# Patient Record
Sex: MALE | Race: WHITE | NOT HISPANIC OR LATINO | URBAN - METROPOLITAN AREA
[De-identification: names, ages, dates, MRNs, and addresses within clinical notes are randomized per-mention and may not be internally consistent; named-entity substitution may affect disease eponyms.]

---

## 2017-10-25 ENCOUNTER — OUTPATIENT (OUTPATIENT)
Dept: OUTPATIENT SERVICES | Facility: HOSPITAL | Age: 77
LOS: 1 days | Discharge: HOME | End: 2017-10-25

## 2017-10-25 DIAGNOSIS — R13.10 DYSPHAGIA, UNSPECIFIED: ICD-10-CM

## 2018-03-12 ENCOUNTER — OUTPATIENT (OUTPATIENT)
Dept: OUTPATIENT SERVICES | Facility: HOSPITAL | Age: 78
LOS: 1 days | Discharge: HOME | End: 2018-03-12

## 2018-03-12 ENCOUNTER — INPATIENT (INPATIENT)
Facility: HOSPITAL | Age: 78
LOS: 9 days | Discharge: HOME HEALTH | End: 2018-03-22
Attending: INTERNAL MEDICINE | Admitting: INTERNAL MEDICINE

## 2018-03-12 VITALS
HEART RATE: 90 BPM | RESPIRATION RATE: 18 BRPM | DIASTOLIC BLOOD PRESSURE: 76 MMHG | TEMPERATURE: 97 F | OXYGEN SATURATION: 96 % | SYSTOLIC BLOOD PRESSURE: 167 MMHG

## 2018-03-12 DIAGNOSIS — R13.10 DYSPHAGIA, UNSPECIFIED: ICD-10-CM

## 2018-03-12 DIAGNOSIS — R63.4 ABNORMAL WEIGHT LOSS: ICD-10-CM

## 2018-03-12 DIAGNOSIS — I10 ESSENTIAL (PRIMARY) HYPERTENSION: ICD-10-CM

## 2018-03-12 DIAGNOSIS — E78.00 PURE HYPERCHOLESTEROLEMIA, UNSPECIFIED: ICD-10-CM
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
## 2018-03-12 DIAGNOSIS — Z98.890 OTHER SPECIFIED POSTPROCEDURAL STATES: Chronic | ICD-10-CM

## 2018-03-12 LAB
ALBUMIN SERPL ELPH-MCNC: 3.8 G/DL — SIGNIFICANT CHANGE UP (ref 3–5.5)
ALP SERPL-CCNC: 82 U/L — SIGNIFICANT CHANGE UP (ref 30–115)
ALT FLD-CCNC: 13 U/L — SIGNIFICANT CHANGE UP (ref 0–41)
ANION GAP SERPL CALC-SCNC: 9 MMOL/L — SIGNIFICANT CHANGE UP (ref 7–14)
APTT BLD: 35.7 SEC — SIGNIFICANT CHANGE UP (ref 27–39.2)
AST SERPL-CCNC: 20 U/L — SIGNIFICANT CHANGE UP (ref 0–41)
BASOPHILS # BLD AUTO: 0.03 K/UL — SIGNIFICANT CHANGE UP (ref 0–0.2)
BASOPHILS NFR BLD AUTO: 0.4 % — SIGNIFICANT CHANGE UP (ref 0–1)
BILIRUB SERPL-MCNC: 0.6 MG/DL — SIGNIFICANT CHANGE UP (ref 0.2–1.2)
BLD GP AB SCN SERPL QL: SIGNIFICANT CHANGE UP
BUN SERPL-MCNC: 12 MG/DL — SIGNIFICANT CHANGE UP (ref 10–20)
CALCIUM SERPL-MCNC: 10 MG/DL — SIGNIFICANT CHANGE UP (ref 8.5–10.1)
CHLORIDE SERPL-SCNC: 101 MMOL/L — SIGNIFICANT CHANGE UP (ref 98–110)
CO2 SERPL-SCNC: 28 MMOL/L — SIGNIFICANT CHANGE UP (ref 17–32)
CREAT SERPL-MCNC: 0.9 MG/DL — SIGNIFICANT CHANGE UP (ref 0.7–1.5)
EOSINOPHIL # BLD AUTO: 0.05 K/UL — SIGNIFICANT CHANGE UP (ref 0–0.7)
EOSINOPHIL NFR BLD AUTO: 0.7 % — SIGNIFICANT CHANGE UP (ref 0–8)
GLUCOSE SERPL-MCNC: 93 MG/DL — SIGNIFICANT CHANGE UP (ref 70–110)
HCT VFR BLD CALC: 43.5 % — SIGNIFICANT CHANGE UP (ref 42–52)
HGB BLD-MCNC: 14.1 G/DL — SIGNIFICANT CHANGE UP (ref 14–18)
IMM GRANULOCYTES NFR BLD AUTO: 0.4 % — HIGH (ref 0.1–0.3)
INR BLD: 1.17 RATIO — SIGNIFICANT CHANGE UP (ref 0.65–1.3)
LYMPHOCYTES # BLD AUTO: 1.09 K/UL — LOW (ref 1.2–3.4)
LYMPHOCYTES # BLD AUTO: 15.6 % — LOW (ref 20.5–51.1)
MCHC RBC-ENTMCNC: 28.4 PG — SIGNIFICANT CHANGE UP (ref 27–31)
MCHC RBC-ENTMCNC: 32.4 G/DL — SIGNIFICANT CHANGE UP (ref 32–37)
MCV RBC AUTO: 87.7 FL — SIGNIFICANT CHANGE UP (ref 80–94)
MONOCYTES # BLD AUTO: 0.48 K/UL — SIGNIFICANT CHANGE UP (ref 0.1–0.6)
MONOCYTES NFR BLD AUTO: 6.9 % — SIGNIFICANT CHANGE UP (ref 1.7–9.3)
NEUTROPHILS # BLD AUTO: 5.29 K/UL — SIGNIFICANT CHANGE UP (ref 1.4–6.5)
NEUTROPHILS NFR BLD AUTO: 76 % — HIGH (ref 42.2–75.2)
NRBC # BLD: 0 /100 WBCS — SIGNIFICANT CHANGE UP (ref 0–0)
PLATELET # BLD AUTO: 398 K/UL — SIGNIFICANT CHANGE UP (ref 130–400)
POTASSIUM SERPL-MCNC: 3.9 MMOL/L — SIGNIFICANT CHANGE UP (ref 3.5–5)
POTASSIUM SERPL-SCNC: 3.9 MMOL/L — SIGNIFICANT CHANGE UP (ref 3.5–5)
PROT SERPL-MCNC: 7 G/DL — SIGNIFICANT CHANGE UP (ref 6–8)
PROTHROM AB SERPL-ACNC: 12.7 SEC — SIGNIFICANT CHANGE UP (ref 9.95–12.87)
RBC # BLD: 4.96 M/UL — SIGNIFICANT CHANGE UP (ref 4.7–6.1)
RBC # FLD: 14.6 % — HIGH (ref 11.5–14.5)
SODIUM SERPL-SCNC: 138 MMOL/L — SIGNIFICANT CHANGE UP (ref 135–146)
TYPE + AB SCN PNL BLD: SIGNIFICANT CHANGE UP
WBC # BLD: 6.97 K/UL — SIGNIFICANT CHANGE UP (ref 4.8–10.8)
WBC # FLD AUTO: 6.97 K/UL — SIGNIFICANT CHANGE UP (ref 4.8–10.8)

## 2018-03-12 RX ORDER — SIMVASTATIN 20 MG/1
1 TABLET, FILM COATED ORAL
Qty: 0 | Refills: 0 | COMMUNITY

## 2018-03-12 RX ORDER — ENOXAPARIN SODIUM 100 MG/ML
40 INJECTION SUBCUTANEOUS EVERY 24 HOURS
Qty: 0 | Refills: 0 | Status: DISCONTINUED | OUTPATIENT
Start: 2018-03-12 | End: 2018-03-18

## 2018-03-12 RX ORDER — LOSARTAN POTASSIUM 100 MG/1
100 TABLET, FILM COATED ORAL DAILY
Qty: 0 | Refills: 0 | Status: DISCONTINUED | OUTPATIENT
Start: 2018-03-12 | End: 2018-03-13

## 2018-03-12 RX ORDER — SIMVASTATIN 20 MG/1
20 TABLET, FILM COATED ORAL AT BEDTIME
Qty: 0 | Refills: 0 | Status: DISCONTINUED | OUTPATIENT
Start: 2018-03-12 | End: 2018-03-13

## 2018-03-12 RX ORDER — PANTOPRAZOLE SODIUM 20 MG/1
40 TABLET, DELAYED RELEASE ORAL
Qty: 0 | Refills: 0 | Status: DISCONTINUED | OUTPATIENT
Start: 2018-03-12 | End: 2018-03-13

## 2018-03-12 NOTE — ED PROVIDER NOTE - NS ED ROS FT
Constitutional: No fever or chills. Normal appetite. 20 lb weight loss in 2 mo. +fatigue and weakness.   Eyes: No vision changes.  ENT: No hearing changes. No ear pain. No sore throat. No surgical site pain.   Neck: No neck pain or stiffness.  Cardiovascular: No chest pain, palpitations, or edema.  Pulmonary: No cough or SOB. No hemoptysis.  Abdominal: No abdominal pain, nausea, vomiting, or diarrhea.   : No dysuria or frequency. No hematuria.   Neuro: No headache, syncope, or dizziness.  MS: No back pain. No calf pain/swelling.  Psych: No suicidal or homicidal ideations.

## 2018-03-12 NOTE — H&P ADULT - NSHPPHYSICALEXAM_GEN_ALL_CORE
General: NAD, Emaciated  HEENT: Surgical scar present. No palpable masses  Heart: RRR, normal S1S2  Lungs: GBBS  Abdomen: +BS, soft, non tender  extremities: no edema

## 2018-03-12 NOTE — ED PROVIDER NOTE - OBJECTIVE STATEMENT
78y M w PMH HTN, HLD, zenker's diverticulum resection 2 months ago by Dr. Brooks at [facility] presents after failed swallowing study today with rehab. Pt states that his procedure was complicated by a hematoma that had to be decompressed the next day. Since the surgery, pt has had difficulty swallowing anything, despite consistent speech therapy. Pt has lost 20 lbs since surgery. Swallowing study today showed pharyngeal dysphagia, and rehab spoke to Dr. Be who recommended PEG placement. Pt has not been evaluated by GI at this point. Pt has also had consistent and worsening hoarseness, which was diagnosed as vocal cord paralysis by his outpt speech therapist but has not been evaluated by ENT since the procedure.

## 2018-03-12 NOTE — ED ADULT NURSE NOTE - CCCP TRG CHIEF CMPLNT
patient s/p zenter's diverticulitis and having difficulty with speech and swallowing/difficulty swallowing

## 2018-03-12 NOTE — H&P ADULT - ATTENDING COMMENTS
patient seen and examined independently on morning rounds, chart reviewed and discussed with medicine resident and agree with the above H/P and assessment and plan with the following addendum:     in brief, Mr. Luna is an 79 yo man with h/o zenckers diverticulum s/p surgical resection january 10 at OSH ([facility]) with post op complicated course including hematoma who now p/w unintentional 20 lb weight loss, dysphagia and decreased po intake as well as voice changes/hoarseness post-operatively.  He was referred by home therapist to see speech/swallow and get barium swallow done which showed signfiicant cause for aspiration (including immobile epiglottis) and sent to the ED where he was admitted to medicicne service for dysphagia w/u and consideration for peg tube.  He denies any fever/chills, sob, chest pain or other associated symptoms.  Review of systems otherwise unremarkable.    pmhx- htn, zenker diverticulum, DLD    meds- see med reconn    psurg hx- zenker diverticulectomy and ? 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 Chowdhury      PE:   NAD, AAOx3, thin, cachectic, +hoarseness, +left diagnoal lateral neck surgical scar   Chest: CTAB, fair air entry  CVS: +s1/s2 rrr  ABD: soft NT, NT +BS  Ext: no e/c/c      labs/radiology reviewed    a/p: 79 yo man with h/o zenckers diverticulum s/p surgical resection 1/10/2018 at OSH ([facility]) who p/w 9 weeks postop of decreased po intake, dysphagia, voice changes and 20 lb weight loss.    #Dysphagia- concern for post-op neuropathy (recurrent laryngeal N damage?)  -keep npo---failed barium swallow with high risk for aspiration  -obtain med records from OSH ([facility])  -CT neck/chest with contrast  -GI evaluation---consider PEG placement thurs/friday  -ENT evaluation  -speech and swallow  -start IVF--suppl electrolytes---if unable to get PEG within next few days would consider TPN/PPN vs NGT  -nutrition eval for feedings once alternative means of feedign in place  -cont ppi    #DVT/GI ppx      -

## 2018-03-12 NOTE — H&P ADULT - PROBLEM SELECTOR PLAN 1
oropharyngeal vs esophageal   [facility] eval for PEG tube evaluation  ENT eval for possible scoping  Nutrition eval Dr Moses  Speech eval to follow  Pureed diet

## 2018-03-12 NOTE — ED PROVIDER NOTE - PHYSICAL EXAMINATION
Constitutional: Well developed. Cachectic. NAD. Good general hygiene.   Head: Atraumatic. Thin hair.   Eyes: PERRLA. EOMI without discomfort.   ENT: No nasal discharge. Mucous membranes moist.  Neck: Supple. Painless ROM.  Cardiovascular: Regular rhythm. Regular rate. Normal S1 and S2. No murmurs. 2+ pulses in all extremities.   Pulmonary: Normal respiratory rate and effort. Lungs clear to auscultation bilaterally. No wheezing, rales, or rhonchi. Bilateral, equal lung expansion.   Abdominal: Soft. Nondistended. Nontender. No rebound or guarding.   Extremities. Pelvis stable. No lower extremity edema. Symmetric calves.  Skin: No rashes.   Neuro: AAOx3. No focal neurological deficits.  Psych: Normal mood. Normal affect.

## 2018-03-12 NOTE — H&P ADULT - NSHPLABSRESULTS_GEN_ALL_CORE
14.1   6.97  )-----------( 398      ( 12 Mar 2018 12:29 )             43.5     03-12    138  |  101  |  12  ----------------------------<  93  3.9   |  28  |  0.9    Ca    10.0      12 Mar 2018 12:29    TPro  7.0  /  Alb  3.8  /  TBili  0.6  /  DBili  x   /  AST  20  /  ALT  13  /  AlkPhos  82  03-12    CXR: No radiographic evidence of acute cardiopulmonary disease.

## 2018-03-12 NOTE — ED PROVIDER NOTE - PROGRESS NOTE DETAILS
ATTENDING NOTE:   77 y/o M s/p recent zenker-diverticulum surgery with complication of hematoma in area, still unable to swallow whereby some liquids and solids get aspirated and others feel to get stuck in esophagus. Pt with 20 pound weight loss over the last several weeks due to this. Had swallow study today confirming that food bolus gets stuck in pharynx.   Exam as documented. Pt is cachectic, will require PEG-tube. Pt is stable for floor.

## 2018-03-13 DIAGNOSIS — R13.10 DYSPHAGIA, UNSPECIFIED: ICD-10-CM

## 2018-03-13 DIAGNOSIS — Z02.9 ENCOUNTER FOR ADMINISTRATIVE EXAMINATIONS, UNSPECIFIED: ICD-10-CM

## 2018-03-13 LAB
ANION GAP SERPL CALC-SCNC: 11 MMOL/L — SIGNIFICANT CHANGE UP (ref 7–14)
APTT BLD: 35.9 SEC — SIGNIFICANT CHANGE UP (ref 27–39.2)
BASOPHILS # BLD AUTO: 0.03 K/UL — SIGNIFICANT CHANGE UP (ref 0–0.2)
BASOPHILS NFR BLD AUTO: 0.5 % — SIGNIFICANT CHANGE UP (ref 0–1)
BLD GP AB SCN SERPL QL: SIGNIFICANT CHANGE UP
BUN SERPL-MCNC: 10 MG/DL — SIGNIFICANT CHANGE UP (ref 10–20)
CALCIUM SERPL-MCNC: 10.2 MG/DL — HIGH (ref 8.5–10.1)
CHLORIDE SERPL-SCNC: 98 MMOL/L — SIGNIFICANT CHANGE UP (ref 98–110)
CO2 SERPL-SCNC: 26 MMOL/L — SIGNIFICANT CHANGE UP (ref 17–32)
CREAT SERPL-MCNC: 0.9 MG/DL — SIGNIFICANT CHANGE UP (ref 0.7–1.5)
CRP SERPL-MCNC: 1.1 MG/DL — HIGH (ref 0–0.4)
EOSINOPHIL # BLD AUTO: 0.07 K/UL — SIGNIFICANT CHANGE UP (ref 0–0.7)
EOSINOPHIL NFR BLD AUTO: 1.2 % — SIGNIFICANT CHANGE UP (ref 0–8)
GLUCOSE SERPL-MCNC: 113 MG/DL — HIGH (ref 70–110)
HCT VFR BLD CALC: 43.3 % — SIGNIFICANT CHANGE UP (ref 42–52)
HGB BLD-MCNC: 14.5 G/DL — SIGNIFICANT CHANGE UP (ref 14–18)
IMM GRANULOCYTES NFR BLD AUTO: 0.5 % — HIGH (ref 0.1–0.3)
INR BLD: 1.17 RATIO — SIGNIFICANT CHANGE UP (ref 0.65–1.3)
LYMPHOCYTES # BLD AUTO: 1.05 K/UL — LOW (ref 1.2–3.4)
LYMPHOCYTES # BLD AUTO: 18.6 % — LOW (ref 20.5–51.1)
MAGNESIUM SERPL-MCNC: 1.9 MG/DL — SIGNIFICANT CHANGE UP (ref 1.8–2.4)
MCHC RBC-ENTMCNC: 28.8 PG — SIGNIFICANT CHANGE UP (ref 27–31)
MCHC RBC-ENTMCNC: 33.5 G/DL — SIGNIFICANT CHANGE UP (ref 32–37)
MCV RBC AUTO: 85.9 FL — SIGNIFICANT CHANGE UP (ref 80–94)
MONOCYTES # BLD AUTO: 0.39 K/UL — SIGNIFICANT CHANGE UP (ref 0.1–0.6)
MONOCYTES NFR BLD AUTO: 6.9 % — SIGNIFICANT CHANGE UP (ref 1.7–9.3)
NEUTROPHILS # BLD AUTO: 4.07 K/UL — SIGNIFICANT CHANGE UP (ref 1.4–6.5)
NEUTROPHILS NFR BLD AUTO: 72.3 % — SIGNIFICANT CHANGE UP (ref 42.2–75.2)
PLATELET # BLD AUTO: 411 K/UL — HIGH (ref 130–400)
POTASSIUM SERPL-MCNC: 3.8 MMOL/L — SIGNIFICANT CHANGE UP (ref 3.5–5)
POTASSIUM SERPL-SCNC: 3.8 MMOL/L — SIGNIFICANT CHANGE UP (ref 3.5–5)
PROTHROM AB SERPL-ACNC: 12.7 SEC — SIGNIFICANT CHANGE UP (ref 9.95–12.87)
RBC # BLD: 5.04 M/UL — SIGNIFICANT CHANGE UP (ref 4.7–6.1)
RBC # FLD: 14.6 % — HIGH (ref 11.5–14.5)
SODIUM SERPL-SCNC: 135 MMOL/L — SIGNIFICANT CHANGE UP (ref 135–146)
TYPE + AB SCN PNL BLD: SIGNIFICANT CHANGE UP
WBC # BLD: 5.64 K/UL — SIGNIFICANT CHANGE UP (ref 4.8–10.8)
WBC # FLD AUTO: 5.64 K/UL — SIGNIFICANT CHANGE UP (ref 4.8–10.8)

## 2018-03-13 RX ORDER — PANTOPRAZOLE SODIUM 20 MG/1
40 TABLET, DELAYED RELEASE ORAL DAILY
Qty: 0 | Refills: 0 | Status: DISCONTINUED | OUTPATIENT
Start: 2018-03-13 | End: 2018-03-14

## 2018-03-13 RX ORDER — SODIUM CHLORIDE 9 MG/ML
1000 INJECTION INTRAMUSCULAR; INTRAVENOUS; SUBCUTANEOUS
Qty: 0 | Refills: 0 | Status: DISCONTINUED | OUTPATIENT
Start: 2018-03-13 | End: 2018-03-14

## 2018-03-13 RX ADMIN — SODIUM CHLORIDE 75 MILLILITER(S): 9 INJECTION INTRAMUSCULAR; INTRAVENOUS; SUBCUTANEOUS at 18:16

## 2018-03-13 RX ADMIN — LOSARTAN POTASSIUM 100 MILLIGRAM(S): 100 TABLET, FILM COATED ORAL at 05:27

## 2018-03-13 RX ADMIN — PANTOPRAZOLE SODIUM 40 MILLIGRAM(S): 20 TABLET, DELAYED RELEASE ORAL at 05:28

## 2018-03-13 RX ADMIN — ENOXAPARIN SODIUM 40 MILLIGRAM(S): 100 INJECTION SUBCUTANEOUS at 05:25

## 2018-03-13 RX ADMIN — PANTOPRAZOLE SODIUM 40 MILLIGRAM(S): 20 TABLET, DELAYED RELEASE ORAL at 18:16

## 2018-03-13 NOTE — CONSULT NOTE ADULT - SUBJECTIVE AND OBJECTIVE BOX
Patient is a 78y old  Male who presents with a chief complaint of dysphasia (12 Mar 2018 22:36)    HPI:  77 yo M with hx of Zenker's Diverticulum s/p surgical resection in January 2018, HTN, DLD presenting with above chief complaint. He was today at the speech and swallow therapist and his swallow testing was abnormal so he was referred to the hospital for further evaluation. He and his wife report dysphagia to solids and liquids after the surgery since January. The patient reports episodes of aspirations and dysphagia and episodes of cough post meals. He reports also weight loss of 20 pounds after the surgery. He denies any fever, chills, or chest pains or history of aspiration pneumonias. Patient on is a pureed diet at home at feels he has lost energy even though he is still active and goes to the gym.  He reports that he followed up with his surgeon twice after the surgery who reassured him that the surgery was successful and the cause for his dysphagia was "all in his head". (12 Mar 2018 15:12)  pt underwent modified barium swallow at [facility] 03/12/18 - pt made npo, 8/8 silent aspiration, sent to ed for PEG placement and given SLP dysphagia exercises to do.  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  pt states he has hoarse voice whispered since surgery     PAST MEDICAL & SURGICAL HISTORY:  Zenker diverticulum  High cholesterol  HTN (hypertension)  H/O excision of Zenker's diverticulum      Home Medications:  candesartan 32 mg oral tablet: 1 tab(s) orally once a day (12 Mar 2018 15:30)  omeprazole 20 mg oral delayed release tablet: 1 tab(s) orally once a day (12 Mar 2018 15:30)  simvastatin 20 mg oral tablet: 1 tab(s) orally once a day (at bedtime) (12 Mar 2018 15:30)      Allergies    No Known Allergies    Vital Signs Last 24 Hrs    T(F): 97 (12 Mar 2018 22:35), Max: 98 (12 Mar 2018 11:00)  HR: 70 (12 Mar 2018 22:35) (70 - 90)  BP: 143/73 (12 Mar 2018 22:35) (128/76 - 167/76)    RR: 18 (12 Mar 2018 22:35) (18 - 19)  SpO2: 96% (12 Mar 2018 22:35) (96% - 100%)      PHYSICAL EXAM:      Constitutional:  aaxo x 3, speech whispered hoarse speech    Eyes: perrl, eomi    ENMT: nares- no discharge, oral exam - buccal mucosa pink moist, tongue, pink moist, free of lesion, masses, or ulcers.  uvula midline, retromolar trigone nonendemic nonerythmasnous  oropharynx patent, without bleed or streaking on walls    pt refusing FFL exam at this time , wants to be sedated    Neck: no lymph adenopathy    Respiratory: cta b/l no difficulty breathing no stridor, no wheeze    Cardiovascular: s1, s2    Gastrointestinal: obese, nontender                              14.1   6.97  )-----------( 398      ( 12 Mar 2018 12:29 )             43.5         03-12    138  |  101  |  12  ----------------------------<  93  3.9   |  28  |  0.9    Ca    10.0      12 Mar 2018 12:29    TPro  7.0  /  Alb  3.8  /  TBili  0.6  /  DBili  x   /  AST  20  /  ALT  13  /  AlkPhos  82  03-12      PT/INR - ( 12 Mar 2018 12:29 )   PT: 12.70 sec;   INR: 1.17 ratio         PTT - ( 12 Mar 2018 12:29 )  PTT:35.7 sec        MEDICATIONS  (STANDING):  enoxaparin Injectable 40 milliGRAM(s) SubCutaneous every 24 hours  losartan 100 milliGRAM(s) Oral daily  pantoprazole    Tablet 40 milliGRAM(s) Oral before breakfast  simvastatin 20 milliGRAM(s) Oral at bedtime    MEDICATIONS  (PRN):

## 2018-03-13 NOTE — PROGRESS NOTE ADULT - SUBJECTIVE AND OBJECTIVE BOX
SUBJECTIVE:Patient is a 78y old  Male who presents with a chief complaint of dysphasia (12 Mar 2018 22:36)  . Today is hospital day 1d     PAST MEDICAL & SURGICAL HISTORY  PAST MEDICAL & SURGICAL HISTORY:  Zenker diverticulum  High cholesterol  HTN (hypertension)  H/O excision of Zenker's diverticulum    ALLERGIES:  No Known Allergies    MEDICATIONS:  STANDING MEDICATIONS  enoxaparin Injectable 40 milliGRAM(s) SubCutaneous every 24 hours  losartan 100 milliGRAM(s) Oral daily  pantoprazole    Tablet 40 milliGRAM(s) Oral before breakfast  simvastatin 20 milliGRAM(s) Oral at bedtime    PRN MEDICATIONS    VITALS:   T(F): 97  HR: 87  BP: 135/67  RR: 18  SpO2: 96%    LABS:                        14.1   6.97  )-----------( 398      ( 12 Mar 2018 12:29 )             43.5     03-12    138  |  101  |  12  ----------------------------<  93  3.9   |  28  |  0.9    Ca    10.0      12 Mar 2018 12:29    TPro  7.0  /  Alb  3.8  /  TBili  0.6  /  DBili  x   /  AST  20  /  ALT  13  /  AlkPhos  82  03-12    PT/INR - ( 12 Mar 2018 12:29 )   PT: 12.70 sec;   INR: 1.17 ratio         PTT - ( 12 Mar 2018 12:29 )  PTT:35.7 sec    PHYSICAL EXAM:  GEN:   LUNGS: Clear to auscultation bilaterally   HEART: S1/S2 present. RRR.   ABD: Soft, non-tender, non-distended. Bowel sounds present  EXT: SUBJECTIVE:Patient is a 78y old  Male who presents with a chief complaint of dysphasia (12 Mar 2018 22:36)  Today is hospital day 1d. Patient seen and examined. NAD. Admits to 20 lb weight loss in 2months. Pooling secretions in mouth. Speech and Swallow evaluation completed, recommended NPO diet for now. Pending GI and Nutritional evaluation.     PAST MEDICAL & SURGICAL HISTORY  PAST MEDICAL & SURGICAL HISTORY:  Zenker diverticulum  High cholesterol  HTN (hypertension)  H/O excision of Zenker's diverticulum    ALLERGIES:  No Known Allergies    MEDICATIONS:  STANDING MEDICATIONS  enoxaparin Injectable 40 milliGRAM(s) SubCutaneous every 24 hours  losartan 100 milliGRAM(s) Oral daily  pantoprazole    Tablet 40 milliGRAM(s) Oral before breakfast  simvastatin 20 milliGRAM(s) Oral at bedtime    PRN MEDICATIONS    VITALS:   T(F): 97  HR: 87  BP: 135/67  RR: 18  SpO2: 96%    LABS:                        14.1   6.97  )-----------( 398      ( 12 Mar 2018 12:29 )             43.5     03-12    138  |  101  |  12  ----------------------------<  93  3.9   |  28  |  0.9    Ca    10.0      12 Mar 2018 12:29    TPro  7.0  /  Alb  3.8  /  TBili  0.6  /  DBili  x   /  AST  20  /  ALT  13  /  AlkPhos  82  03-12    PT/INR - ( 12 Mar 2018 12:29 )   PT: 12.70 sec;   INR: 1.17 ratio         PTT - ( 12 Mar 2018 12:29 )  PTT:35.7 sec    PHYSICAL EXAM:  GEN: NAD. Thin almost cachectic male   LUNGS: Clear to auscultation bilaterally   HEART: S1/S2 present. RRR.   ABD: Soft, non-tender, non-distended. Bowel sounds present  EXT: No edema b/l

## 2018-03-13 NOTE — PROGRESS NOTE ADULT - ASSESSMENT
77 yo M with hx of Zenker's Diverticulum s/p surgical resection in January 2018, HTN, DLD presenting for the evaluation of worsening dysphagia.. He was today at the speech and swallow therapist and his swallow testing was abnormal so he was referred to the hospital for further evaluation. He and his wife report dysphagia to solids and liquids after the surgery since January. The patient reports episodes of aspirations and dysphagia and episodes of cough post meals. He reports also weight loss of 20 pounds after the surgery. He reports that he followed up with his surgeon twice after the surgery who reassured him that the surgery was successful and the cause for his dysphagia was "all in his head".    IMPRESSION:  # Residual Dysphagia s/p Surgical excision of zenker diverticulum ( oropharyngeal vs esophageal)  [facility] eval for PEG tube evaluation  ENT consulted: FFL exam by attn for evaluation of larynx tvc paresis   Nutrition eval Dr Godinez  Speech eval to follow  Pureed diet.     # Weight loss of more than 10% body weight:  Nutrition evaluation with Dr Gutierres service  Should improve with J tube placement.     #  HTN (hypertension): Switch candesartan to Losartan.     # High cholesterol: continue simvastatin 77 yo M with hx of Zenker's Diverticulum s/p surgical resection in January 2018, HTN, DLD presenting for the evaluation of worsening dysphagia.. He was today at the speech and swallow therapist and his swallow testing was abnormal so he was referred to the hospital for further evaluation. He and his wife report dysphagia to solids and liquids after the surgery since January (done at [facility]). The patient reports episodes of aspirations and dysphagia and episodes of cough post meals. He reports also weight loss of 20 pounds after the surgery. He reports that he followed up with his surgeon twice after the surgery who reassured him that the surgery was successful and the cause for his dysphagia was "all in his head".    IMPRESSION:  # Residual Dysphagia s/p Surgical excision of zenker diverticulum ( oropharyngeal vs esophageal)  [facility] eval for PEG tube evaluation: CT Chest/Neck  ENT consulted: FFL exam by attending for evaluation of larynx tvc paresis   Nutrition eval Dr Godinez pending  Speech eval recommend NPO diet for now    # Weight loss of more than 10% body weight:Nutrition evaluation with Dr Gutierres service    #  HTN (hypertension): Switched candesartan to Losartan.     # Hypercholestrolemia: continue simvastatin

## 2018-03-13 NOTE — CONSULT NOTE ADULT - SUBJECTIVE AND OBJECTIVE BOX
Gastroenterology Consultation    78y year old  Male with :    Patient is a 78y old  Male who presents with a chief complaint of dysphasia    HPI:  79 yo M with hx of Zenker's Diverticulum s/p surgical resection in January 2018, HTN, DLD presenting with above chief complaint. He was today at the speech and swallow therapist and his swallow testing was abnormal so he was referred to the hospital for further evaluation. He and his wife report dysphagia to solids and liquids after the surgery since January. The patient reports episodes of aspirations and dysphagia and episodes of cough post meals. He reports also weight loss of 20 pounds after the surgery. He denies any fever, chills, or chest pains or history of aspiration pneumonias. Patient on is a pureed diet at home at feels he has lost energy even though he is still active and goes to the gym.  Patient presents to [facility] because of decreased PO intake SP zencker surgery and because he was not getting answers from his surgeon.      PAST MEDICAL & SURGICAL HISTORY:  Zenker diverticulum  High cholesterol  HTN (hypertension)  H/O excision of Zenker's diverticulum      Allergies: No Known Allergies    Medications:   enoxaparin Injectable 40 milliGRAM(s) SubCutaneous every 24 hours  losartan 100 milliGRAM(s) Oral daily  pantoprazole    Tablet 40 milliGRAM(s) Oral before breakfast  simvastatin 20 milliGRAM(s) Oral at bedtime        Review of Systems: noncontributory other than listed in Admission H & P    Physical Examination:  T(C): 36.1 (03-12-18 @ 22:35), Max: 36.7 (03-12-18 @ 11:00)  HR: 87 (03-13-18 @ 05:21) (70 - 90)  BP: 135/67 (03-13-18 @ 05:21) (128/76 - 167/76)  RR: 18 (03-12-18 @ 22:35) (18 - 19)  SpO2: 96% (03-12-18 @ 22:35) (96% - 100%)      GA: NAD  HEENT: PERRLA  Heart: Regular  Lungs: clear  Abdomen: Soft  Ext noedema    Data:                        14.1   6.97  )-----------( 398      ( 12 Mar 2018 12:29 )             43.5     03-12    138  |  101  |  12  ----------------------------<  93  3.9   |  28  |  0.9    Ca    10.0      12 Mar 2018 12:29    TPro  7.0  /  Alb  3.8  /  TBili  0.6  /  DBili  x   /  AST  20  /  ALT  13  /  AlkPhos  82  03-12    LIVER FUNCTIONS - ( 12 Mar 2018 12:29 )  Alb: 3.8 g/dL / Pro: 7.0 g/dL / ALK PHOS: 82 U/L / ALT: 13 U/L / AST: 20 U/L / GGT: x           PT/INR - ( 12 Mar 2018 12:29 )   PT: 12.70 sec;   INR: 1.17 ratio    PTT - ( 12 Mar 2018 12:29 )  PTT:35.7 sec    Rad:  Modified barrium on 3/12    Impression:    Dysphagia. Gastroenterology Consultation    78y year old  Male with :    Patient is a 78y old  Male who presents with a chief complaint of dysphasia    HPI:  77 yo M with hx of Zenker's Diverticulum s/p surgical resection in January 2018, HTN, DLD presenting with above chief complaint. He was today at the speech and swallow therapist and his swallow testing was abnormal so he was referred to the hospital for further evaluation. He and his wife report dysphagia to solids and liquids after the surgery since January. The patient reports episodes of aspirations and dysphagia and episodes of cough post meals. He reports also weight loss of 20 pounds after the surgery. He denies any fever, chills, or chest pains or history of aspiration pneumonias. Patient on is a pureed diet at home at feels he has lost energy even though he is still active and goes to the gym.  Patient presents to [facility] because of decreased PO intake SP zencker surgery and because he was not getting answers from his surgeon.  Describes bolus globus senstaion with food stuck in the back of his mouth he spits his food right away. denies blood  Last colonoscopy by dr haynes 6 years ago  no EGD in the past (diagnosed by ent and esoaphogram)      PAST MEDICAL & SURGICAL HISTORY:  Zenker diverticulum  High cholesterol  HTN (hypertension)  H/O excision of Zenker's diverticulum      Allergies: No Known Allergies    Medications:   enoxaparin Injectable 40 milliGRAM(s) SubCutaneous every 24 hours  losartan 100 milliGRAM(s) Oral daily  pantoprazole    Tablet 40 milliGRAM(s) Oral before breakfast  simvastatin 20 milliGRAM(s) Oral at bedtime    Review of Systems: noncontributory other than listed in Admission H & P    Physical Examination:  T(C): 36.1 (03-12-18 @ 22:35), Max: 36.7 (03-12-18 @ 11:00)  HR: 87 (03-13-18 @ 05:21) (70 - 90)  BP: 135/67 (03-13-18 @ 05:21) (128/76 - 167/76)  RR: 18 (03-12-18 @ 22:35) (18 - 19)  SpO2: 96% (03-12-18 @ 22:35) (96% - 100%)      GA: NAD  HEENT: PERRLA  Heart: Regular  Lungs: clear  Abdomen: Soft  Ext no edema    Data:                        14.1   6.97  )-----------( 398      ( 12 Mar 2018 12:29 )             43.5     03-12    138  |  101  |  12  ----------------------------<  93  3.9   |  28  |  0.9    Ca    10.0      12 Mar 2018 12:29    TPro  7.0  /  Alb  3.8  /  TBili  0.6  /  DBili  x   /  AST  20  /  ALT  13  /  AlkPhos  82  03-12    LIVER FUNCTIONS - ( 12 Mar 2018 12:29 )  Alb: 3.8 g/dL / Pro: 7.0 g/dL / ALK PHOS: 82 U/L / ALT: 13 U/L / AST: 20 U/L / GGT: x           PT/INR - ( 12 Mar 2018 12:29 )   PT: 12.70 sec;   INR: 1.17 ratio    PTT - ( 12 Mar 2018 12:29 )  PTT:35.7 sec    Rad:  Modified barrium on 3/12  Dysphagia. Gastroenterology Consultation    78y year old  Male with :    Patient is a 78y old  Male who presents with a chief complaint of dysphasia    HPI:  79 yo M with hx of Zenker's Diverticulum s/p surgical resection in January 2018 complicated by a hematoma at the site of surgery requiring a second surgery, HTN, DLD presenting with above chief complaint. He was today at the speech and swallow therapist and his swallow testing was abnormal so he was referred to the hospital for further evaluation. He and his wife report dysphagia to solids and liquids after the surgery since January. The patient reports episodes of aspirations and dysphagia and episodes of cough post meals. He reports also weight loss of 20 pounds after the surgery. He denies any fever, chills, or chest pains or history of aspiration pneumonias. Patient on is a pureed diet at home at feels he has lost energy even though he is still active and goes to the gym.  Patient presents to [facility] because of decreased PO intake SP zencker surgery and because he was not getting answers from his surgeon.  Describes bolus globus senstaion with food stuck in the back of his mouth he spits his food right away. denies blood  Last colonoscopy by dr haynes 6 years ago  No EGD in the past (diagnosed by ent and esoaphogram)      PAST MEDICAL & SURGICAL HISTORY:  Zenker diverticulum  High cholesterol  HTN (hypertension)  H/O excision of Zenker's diverticulum      Allergies: No Known Allergies    Medications:   enoxaparin Injectable 40 milliGRAM(s) SubCutaneous every 24 hours  losartan 100 milliGRAM(s) Oral daily  pantoprazole    Tablet 40 milliGRAM(s) Oral before breakfast  simvastatin 20 milliGRAM(s) Oral at bedtime    Review of Systems: noncontributory other than listed in Admission H & P    Physical Examination:  T(C): 36.1 (03-12-18 @ 22:35), Max: 36.7 (03-12-18 @ 11:00)  HR: 87 (03-13-18 @ 05:21) (70 - 90)  BP: 135/67 (03-13-18 @ 05:21) (128/76 - 167/76)  RR: 18 (03-12-18 @ 22:35) (18 - 19)  SpO2: 96% (03-12-18 @ 22:35) (96% - 100%)      GA: NAD  HEENT: PERRLA  Heart: Regular  Lungs: clear  Abdomen: Soft  Ext no edema    Data:                        14.1   6.97  )-----------( 398      ( 12 Mar 2018 12:29 )             43.5     03-12    138  |  101  |  12  ----------------------------<  93  3.9   |  28  |  0.9    Ca    10.0      12 Mar 2018 12:29    TPro  7.0  /  Alb  3.8  /  TBili  0.6  /  DBili  x   /  AST  20  /  ALT  13  /  AlkPhos  82  03-12    LIVER FUNCTIONS - ( 12 Mar 2018 12:29 )  Alb: 3.8 g/dL / Pro: 7.0 g/dL / ALK PHOS: 82 U/L / ALT: 13 U/L / AST: 20 U/L / GGT: x           PT/INR - ( 12 Mar 2018 12:29 )   PT: 12.70 sec;   INR: 1.17 ratio    PTT - ( 12 Mar 2018 12:29 )  PTT:35.7 sec    Rad:  Modified barrium on 3/12  Dysphagia. Gastroenterology Consultation    78y year old  Male with :    Patient is a 78y old  Male who presents with a chief complaint of dysphagia    HPI:  79 yo M with hx of Zenker's Diverticulum s/p surgical resection in January 2018 complicated by a hematoma at the site of surgery requiring a second surgery, HTN, DLD presenting with above chief complaint. He was today at the speech and swallow therapist and his swallow testing was abnormal so he was referred to the hospital for further evaluation. He and his wife report dysphagia to solids and liquids after the surgery since January. The patient reports episodes of aspirations and dysphagia and episodes of cough post meals. He reports also weight loss of 20 pounds after the surgery. He denies any fever, chills, or chest pains or history of aspiration pneumonias. Patient on is a pureed diet at home at feels he has lost energy even though he is still active and goes to the gym.  Patient presents to [facility] because of decreased PO intake SP zenker surgery and because he was not getting answers from his surgeon.  Describes globus senstaion with food stuck in the back of his mouth he spits his food right away. denies blood  Last colonoscopy by dr brink 6 years ago  No EGD in the past (diagnosed by ent and esoaphogram)      PAST MEDICAL & SURGICAL HISTORY:  Zenker diverticulum  High cholesterol  HTN (hypertension)  H/O excision of Zenker's diverticulum      Allergies: No Known Allergies    Medications:   enoxaparin Injectable 40 milliGRAM(s) SubCutaneous every 24 hours  losartan 100 milliGRAM(s) Oral daily  pantoprazole    Tablet 40 milliGRAM(s) Oral before breakfast  simvastatin 20 milliGRAM(s) Oral at bedtime    Review of Systems: noncontributory other than listed in Admission H & P    Physical Examination:  T(C): 36.1 (03-12-18 @ 22:35), Max: 36.7 (03-12-18 @ 11:00)  HR: 87 (03-13-18 @ 05:21) (70 - 90)  BP: 135/67 (03-13-18 @ 05:21) (128/76 - 167/76)  RR: 18 (03-12-18 @ 22:35) (18 - 19)  SpO2: 96% (03-12-18 @ 22:35) (96% - 100%)      GA: NAD  HEENT: PERRLA. hoarse voice  Heart: Regular  Lungs: clear  Abdomen: Soft  Ext no edema  neck: scar and swelling on the side of his neck    Data:                        14.1   6.97  )-----------( 398      ( 12 Mar 2018 12:29 )             43.5     03-12    138  |  101  |  12  ----------------------------<  93  3.9   |  28  |  0.9    Ca    10.0      12 Mar 2018 12:29    TPro  7.0  /  Alb  3.8  /  TBili  0.6  /  DBili  x   /  AST  20  /  ALT  13  /  AlkPhos  82  03-12    LIVER FUNCTIONS - ( 12 Mar 2018 12:29 )  Alb: 3.8 g/dL / Pro: 7.0 g/dL / ALK PHOS: 82 U/L / ALT: 13 U/L / AST: 20 U/L / GGT: x           PT/INR - ( 12 Mar 2018 12:29 )   PT: 12.70 sec;   INR: 1.17 ratio    PTT - ( 12 Mar 2018 12:29 )  PTT:35.7 sec    Rad:  Modified barrium on 3/12  Dysphagia.

## 2018-03-13 NOTE — SWALLOW BEDSIDE ASSESSMENT ADULT - SLP PERTINENT HISTORY OF CURRENT PROBLEM
s/p zenkers diverticulectomy with post op complications of hematoma and worsening dysphagia and voice

## 2018-03-13 NOTE — CONSULT NOTE ADULT - ASSESSMENT
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

## 2018-03-13 NOTE — CONSULT NOTE ADULT - ATTENDING COMMENTS
I have seen, examined and scoped the patient at the bedside.  Patient is s/p left anterior cervical approach to a Zenker's diverticulum. Left vocal cord diminished motion seen on exam today, probably related to dysphagia and dysphonia.    Recommend CT scan of the neck with contrast to plan next step in treatment. possible option of vocal cord injection with calcium hydroxyapetite.     will follow after CT

## 2018-03-13 NOTE — CONSULT NOTE ADULT - ASSESSMENT
77 yo M SP recent zenker's surgery presents to the ED for dysphagia.  - Ct neck/chest  - ENT eval  - Would recommend follow up by CT surgery (preferably the orginal surgeon) for eval and possible treatment prior to PEg placement. 79 yo M SP recent zenker's surgery presents to the ED for dysphagia.  - Ct neck/chest w IV contrast  - ENT eval  - If the CT fails to show a mechanical anatomical etiology for the dysphagia we will probably schedule him for a PEG tube placement. the case will be discussed with anesthesia.  - NPO for now 79 yo M SP recent zenker's surgery presents to the ED for dysphagia.  - Ct neck/chest w IV contrast  - ENT eval  - If the CT fails to show a mechanical anatomical etiology for the dysphagia (favoring possible vagal nerve damage) we will probably schedule him for a PEG tube placement. the case will be discussed with anesthesia.  - NPO for now 79 yo M SP recent zenker's surgery presents to the ED for dysphagia.   Discussed with speech pathology who believe that the surgery was effective and that his residual symptoms are related to possible nerve injury  - Ct neck/chest w IV contrast  - ENT eval  -obtain records (including CD of imaging) from [facility] [[email or web address]]  - If the CT fails to show a mechanical anatomical etiology for the dysphagia (favoring possible vagal nerve damage) we will probably schedule him for a PEG tube placement. the patient will be discussed with anesthesia.  - NPO for now   - IVF

## 2018-03-14 LAB
ANION GAP SERPL CALC-SCNC: 10 MMOL/L — SIGNIFICANT CHANGE UP (ref 7–14)
APTT BLD: 42.7 SEC — HIGH (ref 27–39.2)
BUN SERPL-MCNC: 13 MG/DL — SIGNIFICANT CHANGE UP (ref 10–20)
CALCIUM SERPL-MCNC: 9.5 MG/DL — SIGNIFICANT CHANGE UP (ref 8.5–10.1)
CHLORIDE SERPL-SCNC: 104 MMOL/L — SIGNIFICANT CHANGE UP (ref 98–110)
CO2 SERPL-SCNC: 23 MMOL/L — SIGNIFICANT CHANGE UP (ref 17–32)
CREAT SERPL-MCNC: 1 MG/DL — SIGNIFICANT CHANGE UP (ref 0.7–1.5)
GLUCOSE SERPL-MCNC: 62 MG/DL — LOW (ref 70–110)
HCT VFR BLD CALC: 39.6 % — LOW (ref 42–52)
HGB BLD-MCNC: 13.1 G/DL — LOW (ref 14–18)
MCHC RBC-ENTMCNC: 28.9 PG — SIGNIFICANT CHANGE UP (ref 27–31)
MCHC RBC-ENTMCNC: 33.1 G/DL — SIGNIFICANT CHANGE UP (ref 32–37)
MCV RBC AUTO: 87.4 FL — SIGNIFICANT CHANGE UP (ref 80–94)
NRBC # BLD: 0 /100 WBCS — SIGNIFICANT CHANGE UP (ref 0–0)
PLATELET # BLD AUTO: 336 K/UL — SIGNIFICANT CHANGE UP (ref 130–400)
POTASSIUM SERPL-MCNC: 3.8 MMOL/L — SIGNIFICANT CHANGE UP (ref 3.5–5)
POTASSIUM SERPL-SCNC: 3.8 MMOL/L — SIGNIFICANT CHANGE UP (ref 3.5–5)
PREALB SERPL-MCNC: 18 MG/DL — SIGNIFICANT CHANGE UP (ref 18–45)
RBC # BLD: 4.53 M/UL — LOW (ref 4.7–6.1)
RBC # FLD: 14.6 % — HIGH (ref 11.5–14.5)
SODIUM SERPL-SCNC: 137 MMOL/L — SIGNIFICANT CHANGE UP (ref 135–146)
TRIGL SERPL-MCNC: 102 MG/DL — SIGNIFICANT CHANGE UP (ref 40–150)
TRIGL SERPL-MCNC: 74 MG/DL — SIGNIFICANT CHANGE UP (ref 40–150)
WBC # BLD: 6.32 K/UL — SIGNIFICANT CHANGE UP (ref 4.8–10.8)
WBC # FLD AUTO: 6.32 K/UL — SIGNIFICANT CHANGE UP (ref 4.8–10.8)

## 2018-03-14 RX ORDER — ELECTROLYTE SOLUTION,INJ
1 VIAL (ML) INTRAVENOUS
Qty: 0 | Refills: 0 | Status: DISCONTINUED | OUTPATIENT
Start: 2018-03-14 | End: 2018-03-14

## 2018-03-14 RX ORDER — I.V. FAT EMULSION 20 G/100ML
1.93 EMULSION INTRAVENOUS
Qty: 111.17 | Refills: 0 | Status: DISCONTINUED | OUTPATIENT
Start: 2018-03-14 | End: 2018-03-14

## 2018-03-14 RX ADMIN — ENOXAPARIN SODIUM 40 MILLIGRAM(S): 100 INJECTION SUBCUTANEOUS at 05:46

## 2018-03-14 RX ADMIN — I.V. FAT EMULSION 34.74 GM/KG/DAY: 20 EMULSION INTRAVENOUS at 21:31

## 2018-03-14 RX ADMIN — Medication 1 EACH: at 21:32

## 2018-03-14 NOTE — CHART NOTE - NSCHARTNOTEFT_GEN_A_CORE
77 yo male with hx of Zenker's Diverticulum s/p surgical resection 1/10/18 by Dr. Brooks at [facility]. Since surgery his voice is weak/hoarse, PO intake has worsened due to dysphagia to liquids and solids (speech therapist comes to the house for therapy) and reports a 25lb weight loss with associated weakness.  He presented for further evaluation after his swallow test was abnormal. The patient reports episodes of aspiration and dysphagia and episodes of cough post meals. He denies any fever, chills, chest pains or history of aspiration pneumonia.. Patient on is a pureed diet at home at feels he has lost energy even though he is still active and goes to the gym. He reports that he followed up with his surgeon twice after the surgery who reassured him that the surgery was successful and the cause for his dysphagia was "all in his head". (12 Mar 2018 15:12)    A&O X 3  Voice hoarse and weak  Appears thin with muscle wasting & loss of subcutaneous fat  Abd: soft, ND  Ext: no edema  Peripheral IV right arm, no CVC    PAST MEDICAL & SURGICAL HISTORY:  Zenker diverticulum  High cholesterol  HTN (hypertension)  H/O excision of Zenker's diverticulum (1/10/18)    Home Medications:  candesartan 32 mg oral tablet: 1 tab(s) orally once a day (12 Mar 2018 15:30)  omeprazole 20 mg oral delayed release tablet: 1 tab(s) orally once a day (12 Mar 2018 15:30)  simvastatin 20 mg oral tablet: 1 tab(s) orally once a day (at bedtime) (12 Mar 2018 15:30)  ***reports primary doctor started him on Vitamin B-12 shots last week and he's due for another weekly shot this week    MEDICATIONS  (STANDING):  enoxaparin Injectable 40 milliGRAM(s) SubCutaneous every 24 hours  fat emulsion (Plant Based) 20% Infusion 1.93 Gm/kG/Day (34.74 mL/Hr) IV Continuous <Continuous>*** (ordered by NST to start tonight)  pantoprazole  Injectable 40 milliGRAM(s) IV Push daily ***(D/C once PPN starts, pepcid in PPN)  Parenteral Nutrition - Adult 1 Each (75 mL/Hr) TPN Continuous <Continuous> ***(ordered by NST to start tonight)  sodium chloride 0.9%. 1000 milliLiter(s) (75 mL/Hr) IV Continuous <Continuous> ***(to be d/c'd once PPN starts)    Allergies    No Known Allergies    Diet: NPO    03-14    137  |  104  |  13  ----------------------------<  62<L>  3.8   |  23  |  1.0    Ca    9.5      14 Mar 2018 07:34  Mg     1.9     03-13    TPro  7.0  /  Alb  3.8  /  TBili  0.6  /  DBili  x   /  AST  20  /  ALT  13  /  AlkPhos  82  03-12                        13.1   6.32  )-----------( 336      ( 14 Mar 2018 07:34 )             39.6       < from: Xray Modified Barium Swallow (03.12.18 @ 10:47) >    EXAM:  XR MODIFIED BARIUM SWALLOW            PROCEDURE DATE:  03/12/2018    INTERPRETATION:  Clinical History / Reason for exam: Dysphagia.    Procedure: Various consistencies of food were given to the patient and   swallowing was observed under fluoroscopy.  This study was performed in   conjunction with the speech pathology department.    Findings/  Impression:    Dysphagia.    Fluoroscopic assistance was provided by the Radiologist to the speech   pathologist for a modified barium swallow.  Please refer to the speech   pathologist's report for detailed description of the findings and   recommendations.    Per speech note: Profound pharyngeal dysphagia as per VFSS on 3/12    Drug Dosing Weight  Height (cm): 170.18 (12 Mar 2018 22:35)  Weight (kg): 57.6 (12 Mar 2018 22:35)  BMI (kg/m2): 19.9 (12 Mar 2018 22:35)  BSA (m2): 1.67 (12 Mar 2018 22:35)    Reports usual weight is approximately 152lbs and currently 127lbs  *severe 16% weight loss over past 3 months    Impression:  Zenker's diverticulum s/p removal 1/10/18 with post-op dysphagia and dysphonia  Severe pharyngeal dysphagia (on VFSS)  Severe weight loss, 16% over 3 months  HTN  Dyslipidemia    Recommendations:  Pt unable to tolerate PO due to dysphagia over the past 2 months and is currently NPO while workup in progress. CT chest/neck is planned for today, await results. Seen by ENT and GI. ? nerve involvement. Depending on CT results may benefit from PEG long term. For now will start PPN while workup in progress and until treatment plan formulated  1. PPN to begin tonight  2. D/C IVF's once PPN begins tonight  3. D/C protonix, pepcid is in PPN (protonix not compatible in PPN)  4. Check TG level today prior to starting PPN  5. 3/15 AM BMP, IP, MG, vitamin B-12 level, vitamin D  6. F/U CT results 77 yo male with hx of Zenker's Diverticulum s/p surgical resection 1/10/18 by Dr. Brooks at [facility]. Since surgery his voice is weak/hoarse, PO intake has worsened due to dysphagia to liquids and solids (speech therapist comes to the house for therapy) and reports a 25lb weight loss with associated weakness.  He presented for further evaluation after his swallow test was abnormal. The patient reports episodes of aspiration and dysphagia and episodes of cough post meals. He denies any fever, chills, chest pains or history of aspiration pneumonia.. Patient on is a pureed diet at home at feels he has lost energy even though he is still active and goes to the gym. He reports that he followed up with his surgeon twice after the surgery who reassured him that the surgery was successful and the cause for his dysphagia was "all in his head". (12 Mar 2018 15:12)    A&O X 3  Voice hoarse and weak  Appears thin with muscle wasting & loss of subcutaneous fat  Abd: soft, ND  Ext: no edema  Peripheral IV right arm, no CVC    PAST MEDICAL & SURGICAL HISTORY:  Zenker diverticulum  High cholesterol  HTN (hypertension)  H/O excision of Zenker's diverticulum (1/10/18)    Home Medications:  candesartan 32 mg oral tablet: 1 tab(s) orally once a day (12 Mar 2018 15:30)  omeprazole 20 mg oral delayed release tablet: 1 tab(s) orally once a day (12 Mar 2018 15:30)  simvastatin 20 mg oral tablet: 1 tab(s) orally once a day (at bedtime) (12 Mar 2018 15:30)  ***reports primary doctor started him on Vitamin B-12 shots last week and he's due for another weekly shot this week    MEDICATIONS  (STANDING):  enoxaparin Injectable 40 milliGRAM(s) SubCutaneous every 24 hours  fat emulsion (Plant Based) 20% Infusion 1.93 Gm/kG/Day (34.74 mL/Hr) IV Continuous <Continuous>*** (ordered by NST to start tonight)  pantoprazole  Injectable 40 milliGRAM(s) IV Push daily ***(D/C once PPN starts, pepcid in PPN)  Parenteral Nutrition - Adult 1 Each (75 mL/Hr) TPN Continuous <Continuous> ***(ordered by NST to start tonight)  sodium chloride 0.9%. 1000 milliLiter(s) (75 mL/Hr) IV Continuous <Continuous> ***(to be d/c'd once PPN starts)    Allergies    No Known Allergies    Diet: NPO    03-14    137  |  104  |  13  ----------------------------<  62<L>  3.8   |  23  |  1.0    Ca    9.5      14 Mar 2018 07:34  Mg     1.9     03-13    TPro  7.0  /  Alb  3.8  /  TBili  0.6  /  DBili  x   /  AST  20  /  ALT  13  /  AlkPhos  82  03-12                        13.1   6.32  )-----------( 336      ( 14 Mar 2018 07:34 )             39.6     C-Reactive Protein, Serum (03.13.18 @ 08:27)    C-Reactive Protein, Serum: 1.10 mg/dL  Prealbumin, Serum (03.13.18 @ 08:27)    Prealbumin, Serum: 18 mg/dL    < from: Xray Modified Barium Swallow (03.12.18 @ 10:47) >    EXAM:  XR MODIFIED BARIUM SWALLOW            PROCEDURE DATE:  03/12/2018    INTERPRETATION:  Clinical History / Reason for exam: Dysphagia.    Procedure: Various consistencies of food were given to the patient and   swallowing was observed under fluoroscopy.  This study was performed in   conjunction with the speech pathology department.    Findings/  Impression:    Dysphagia.    Fluoroscopic assistance was provided by the Radiologist to the speech   pathologist for a modified barium swallow.  Please refer to the speech   pathologist's report for detailed description of the findings and   recommendations.    Per speech note: Profound pharyngeal dysphagia as per VFSS on 3/12    Drug Dosing Weight  Height (cm): 170.18 (12 Mar 2018 22:35)  Weight (kg): 57.6 (12 Mar 2018 22:35)  BMI (kg/m2): 19.9 (12 Mar 2018 22:35)  BSA (m2): 1.67 (12 Mar 2018 22:35)    Reports usual weight is approximately 152lbs and currently 127lbs  *severe 16% weight loss over past 3 months    Impression:  Zenker's diverticulum s/p removal 1/10/18 with post-op dysphagia and dysphonia  Severe pharyngeal dysphagia (on VFSS)  Severe weight loss, 16% over 3 months  HTN  Dyslipidemia    Recommendations:  Pt unable to tolerate PO due to dysphagia over the past 2 months and is currently NPO while workup in progress. CT chest/neck is planned for today, await results. Seen by ENT and GI. ? nerve involvement. Depending on CT results may benefit from PEG long term. For now will start PPN while workup in progress and until treatment plan formulated  1. PPN to begin tonight  2. D/C IVF's once PPN begins tonight  3. D/C protonix, pepcid is in PPN (protonix not compatible in PPN)  4. Check TG level today prior to starting PPN  5. 3/15 AM BMP, IP, MG, vitamin B-12 level, vitamin D  6. F/U CT results

## 2018-03-14 NOTE — SWALLOW BEDSIDE ASSESSMENT ADULT - SWALLOW EVAL: DIAGNOSIS
profound pharyngeal dysphagia and gross silent aspiration as per MBS 3/12
Profound pharyngeal dysphagia as per VFSS on 3/12

## 2018-03-14 NOTE — PROGRESS NOTE ADULT - ASSESSMENT
79 yo M with hx of Zenker's Diverticulum s/p surgical resection in January 2018, HTN, DLD presenting for the evaluation of worsening dysphagia.. He was today at the speech and swallow therapist and his swallow testing was abnormal so he was referred to the hospital for further evaluation. He and his wife report dysphagia to solids and liquids after the surgery since January (done at [facility]). The patient reports episodes of aspirations and dysphagia and episodes of cough post meals. He reports also weight loss of 20 pounds after the surgery. He reports that he followed up with his surgeon twice after the surgery who reassured him that the surgery was successful and the cause for his dysphagia was "all in his head".    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 Patient to be started on PPN today  f/u labs for AM placed    #  HTN (hypertension): Switched candesartan to Losartan.     # Hypercholesterolemia: continue simvastatin

## 2018-03-14 NOTE — SWALLOW BEDSIDE ASSESSMENT ADULT - ASR SWALLOW ASPIRATION MONITOR
change of breathing pattern/cough/throat clearing/oral hygiene/fever/upper respiratory infection/gurgly voice/pneumonia
fever/pneumonia/throat clearing/upper respiratory infection/change of breathing pattern/cough/gurgly voice/Frequent oral care may swish water and spit out/oral hygiene

## 2018-03-14 NOTE — PROGRESS NOTE ADULT - ASSESSMENT
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    #Dysphagia- concern for post-op neuropathy (recurrent laryngeal N damage?)  -keep npo---failed barium swallow with high risk for aspiration  -attempting to obtain med records from OSH ([facility])  -CT neck/chest with contrast  -GI consult f/u----consider PEG placement thursday/friday   -ENT evaluation  -speech and swallow following  -start IVF--suppl electrolytes--  -start PPN today----d/w patient and wife and want to wait for results of CT before agreeing for PEG (would consider NGT however explained to them may be difficult to insert NGT and also due to h/o zenckers diverticulum excision concern for higher risk for perforation with NGT insertion---f/u with GI recomm for enteral feeding options   -nutrition eval  -cont ppi  -transition medications to IV    #DVT/GI ppx      -

## 2018-03-14 NOTE — PROGRESS NOTE ADULT - SUBJECTIVE AND OBJECTIVE BOX
in brief, Mr. Luna is an 79 yo man with h/o zenckers diverticulum s/p surgical resection january 10 at OSH ([facility]) with post op complicated course including hematoma who now p/w unintentional 20 lb weight loss, dysphagia and decreased po intake as well as voice changes/hoarseness post-operatively.  He was referred by home therapist to see speech/swallow and get barium swallow done which showed signfiicant cause for aspiration (including immobile epiglottis) and sent to the ED where he was admitted to medicicne service for dysphagia w/u and consideration for peg tube.  He denies any fever/chills, sob, chest pain or other associated symptoms.  Review of systems otherwise unremarkable.    pcp-Dr. Chowdhury    patient seen and examined independently on morning rounds, chart reviewed and discussed with medicine resident and on interdisciplinary rounds-    no overnight events- remains NPO- awaiting CT chest and neck- to start PPN today-       PE:   NAD, AAOx3, thin, cachectic, +hoarseness, +left diagnoal lateral neck surgical scar   Chest: CTAB, fair air entry  CVS: +s1/s2 rrr  ABD: soft NT, NT +BS  Ext: no e/c/c      Vital Signs Last 24 Hrs  T(C): 35.9 (14 Mar 2018 07:48), Max: 35.9 (14 Mar 2018 07:48)  T(F): 96.6 (14 Mar 2018 07:48), Max: 96.6 (14 Mar 2018 07:48)  HR: 81 (14 Mar 2018 07:48) (62 - 81)  BP: 107/52 (14 Mar 2018 07:48) (107/52 - 119/71)  BP(mean): --  RR: 18 (14 Mar 2018 07:48) (18 - 20)  SpO2: --    I&O's Summary      Labs:                        13.1   6.32  )-----------( 336      ( 14 Mar 2018 07:34 )             39.6     CBC Full  -  ( 14 Mar 2018 07:34 )  WBC Count : 6.32 K/uL  Hemoglobin : 13.1 g/dL  Hematocrit : 39.6 %  Platelet Count - Automated : 336 K/uL  Mean Cell Volume : 87.4 fL  Mean Cell Hemoglobin : 28.9 pg  Mean Cell Hemoglobin Concentration : 33.1 g/dL  Auto Neutrophil # : x  Auto Lymphocyte # : x  Auto Monocyte # : x  Auto Eosinophil # : x  Auto Basophil # : x  Auto Neutrophil % : x  Auto Lymphocyte % : x  Auto Monocyte % : x  Auto Eosinophil % : x  Auto Basophil % : x      03-14    137  |  104  |  13  ----------------------------<  62<L>  3.8   |  23  |  1.0    Ca    9.5      14 Mar 2018 07:34  Mg     1.9     03-13        Microbiology:

## 2018-03-14 NOTE — PROGRESS NOTE ADULT - SUBJECTIVE AND OBJECTIVE BOX
SUBJECTIVE:Patient is a 78y old  Male who presents with a chief complaint of dysphasia (12 Mar 2018 22:36)  Today is hospital day 2d. Patient seen and examined. NAD.     PAST MEDICAL & SURGICAL HISTORY  PAST MEDICAL & SURGICAL HISTORY:  Zenker diverticulum  High cholesterol  HTN (hypertension)  H/O excision of Zenker's diverticulum    ALLERGIES:  No Known Allergies    MEDICATIONS:  STANDING MEDICATIONS  enoxaparin Injectable 40 milliGRAM(s) SubCutaneous every 24 hours  fat emulsion (Plant Based) 20% Infusion 1.93 Gm/kG/Day IV Continuous <Continuous>  Parenteral Nutrition - Adult 1 Each TPN Continuous <Continuous>  sodium chloride 0.9%. 1000 milliLiter(s) IV Continuous <Continuous>    PRN MEDICATIONS    VITALS:   T(F): 96.6  HR: 81  BP: 107/52  RR: 18  SpO2: --    LABS:                        13.1   6.32  )-----------( 336      ( 14 Mar 2018 07:34 )             39.6     03-14    137  |  104  |  13  ----------------------------<  62<L>  3.8   |  23  |  1.0    Ca    9.5      14 Mar 2018 07:34  Mg     1.9     03-13      PT/INR - ( 13 Mar 2018 08:27 )   PT: 12.70 sec;   INR: 1.17 ratio         PTT - ( 14 Mar 2018 07:34 )  PTT:42.7 sec    PHYSICAL EXAM:  GEN: NAD, thin elderly made  LUNGS: Clear to auscultation bilaterally   HEART: S1/S2 present. RRR.   ABD: Soft, non-tender, non-distended. Bowel sounds present  EXT: No b/l LE edema

## 2018-03-14 NOTE — SWALLOW BEDSIDE ASSESSMENT ADULT - COMMENTS
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

## 2018-03-15 LAB — 24R-OH-CALCIDIOL SERPL-MCNC: 33.4 NG/ML — SIGNIFICANT CHANGE UP (ref 30–80)

## 2018-03-15 RX ORDER — I.V. FAT EMULSION 20 G/100ML
1.93 EMULSION INTRAVENOUS
Qty: 111.17 | Refills: 0 | Status: DISCONTINUED | OUTPATIENT
Start: 2018-03-15 | End: 2018-03-15

## 2018-03-15 RX ORDER — ELECTROLYTE SOLUTION,INJ
1 VIAL (ML) INTRAVENOUS
Qty: 0 | Refills: 0 | Status: DISCONTINUED | OUTPATIENT
Start: 2018-03-15 | End: 2018-03-15

## 2018-03-15 RX ADMIN — I.V. FAT EMULSION 34.74 GM/KG/DAY: 20 EMULSION INTRAVENOUS at 23:09

## 2018-03-15 RX ADMIN — Medication 75 EACH: at 22:17

## 2018-03-15 RX ADMIN — ENOXAPARIN SODIUM 40 MILLIGRAM(S): 100 INJECTION SUBCUTANEOUS at 06:17

## 2018-03-15 NOTE — PROGRESS NOTE ADULT - SUBJECTIVE AND OBJECTIVE BOX
SUBJECTIVE:Patient is a 78y old  Male who presents with a chief complaint of dysphasia (12 Mar 2018 22:36)  . Today is hospital day 3d. Up and mobile. No acute distress. PPN started yesterday as per Nutritional Recommendations. GI to follow up today as well as ENT. Patient and family reluctant for PEG tube and would like ot avoid if possible.     PAST MEDICAL & SURGICAL HISTORY  PAST MEDICAL & SURGICAL HISTORY:  Zenker diverticulum  High cholesterol  HTN (hypertension)  H/O excision of Zenker's diverticulum    ALLERGIES:  No Known Allergies    MEDICATIONS:  STANDING MEDICATIONS  enoxaparin Injectable 40 milliGRAM(s) SubCutaneous every 24 hours  fat emulsion (Plant Based) 20% Infusion 1.93 Gm/kG/Day IV Continuous <Continuous>  Parenteral Nutrition - Adult 1 Each TPN Continuous <Continuous>    PRN MEDICATIONS    VITALS:   T(F): 98.6  HR: 76  BP: 118/56  RR: 18  SpO2: --    LABS:                        13.1   6.32  )-----------( 336      ( 14 Mar 2018 07:34 )             39.6     03-14    137  |  104  |  13  ----------------------------<  62<L>  3.8   |  23  |  1.0    Ca    9.5      14 Mar 2018 07:34      PTT - ( 14 Mar 2018 07:34 )  PTT:42.7 sec              RADIOLOGY:    PHYSICAL EXAM:  GEN: NAD  LUNGS: Clear to auscultation bilaterally   HEART: S1/S2 present. RRR.   ABD: Soft, non-tender, non-distended. Bowel sounds present  EXT: NO b/l LE Edema

## 2018-03-15 NOTE — PROGRESS NOTE ADULT - SUBJECTIVE AND OBJECTIVE BOX
A&O, afebrile  R arm IV access  no LE edema  abd soft, NT, ND  + dysphonia, severe + dysphagia  severe bitemp wasting, infraclavicular, infrascapular wasting due to weight loss - pt known to be an active exerciser, and he's lost signif muscle mass    No labs drawn this morning. All prior labs reviewed. Note surprisingly normal Vit D level   Triglyc levels normal.  Note that pt gives h/o taking several vitamin and mineral supplements regularly, incl D, until after the Zenker's surgery.    Discussed at length with pt and his wife. Discussed additionally together with GI, regarding possible PEG and it's use and care an feeding at home.  PPN started last evening, and will continue.  CT findings noted.  Await ENT input and possible plan. A&O, afebrile  R arm IV access  no LE edema  abd soft, NT, ND  + dysphonia, severe + dysphagia  severe bitemp wasting, infraclavicular, infrascapular wasting due to weight loss - pt known to be an active exerciser, and he's lost signif muscle mass    No labs drawn this morning. All prior labs reviewed. Note surprisingly normal Vit D level   Triglyc levels normal.  Note that pt gives h/o taking several vitamin and mineral supplements regularly, incl D, until after the Zenker's surgery.    IMP:  - dysphagia and dysphonia following surgery for Zenker's diverticulum - note pt developed hematoma and required re-operation later the same day  - vocal cord injury and soft tissue abnormality (?mass)  - severe malnutrition, starvation/ marasmic type    Discussed at length with pt and his wife. Discussed additionally together with GI, regarding possible PEG and it's use and care an feeding at home.  PPN started last evening, and will continue.  CT findings noted.  Await ENT input and possible plan.

## 2018-03-15 NOTE — PROGRESS NOTE ADULT - ASSESSMENT
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 He was today at the speech and swallow therapist and his swallow testing was abnormal so he was referred to the hospital for further evaluation. He reports also weight loss of 20 pounds after the surgery.     IMPRESSION:  # Residual Dysphagia s/p Surgical excision of zenker diverticulum ( oropharyngeal vs esophageal)  GI recommendations noted and appreciated: ENT follow to see if any intervention can be done to avoid PEG. If not will proceed with PEG.   s/p CT neck and chest with IV contrast for soft tissue noted  ENT consulted: FFL exam completed. Spoke with ENT (s# [id number]) Will review scans today with attending and follow up with further recommendations  Speech eval recommend NPO diet for now  Information release form for medical records from [facility] - sent, f/u for arrival of records    # Weight loss of more than 10% body weight:   Nutrition evaluation noted. Continue PPN and follow up with Nutrition today.    #  HTN (hypertension): Switched candesartan to Losartan.     # Hypercholesterolemia: continue simvastatin

## 2018-03-15 NOTE — PROGRESS NOTE ADULT - ASSESSMENT
79 Yo M SP L anterior approach Zencker diverticulotomy with altered anatomy in the surgical site/ L vocal cord edema followed up by ENT (possible plan for injection). Now reluctant about PEG tube. Currently on TPN.    Plan:  - Follow up with ENT.  - If dysphagia persists and parenteral feeding tube needed and accepted by patient we will consider it after running it by anasthesia. 77 Yo M SP L anterior approach Zencker diverticulotomy with altered anatomy in the surgical site/ L vocal cord edema followed up by ENT (possible plan for injection). Now reluctant about PEG tube. Currently on TPN.    Plan:  - Follow up with ENT.  - If dysphagia persists and parenteral feeding tube needed and accepted by patient we will consider it after consulting anasthesia. 79 Yo M SP L anterior approach Zencker diverticulotomy with altered anatomy in the surgical site/ L vocal cord edema followed up by ENT (possible plan for injection). Now reluctant about PEG tube. Currently on PPN.    Plan:  - Follow up with ENT regarding the soft tissue density in his neck  - If dysphagia persists and parenteral feeding tube needed and accepted by patient we will consider it after consulting anasthesia.

## 2018-03-15 NOTE — PROGRESS NOTE ADULT - SUBJECTIVE AND OBJECTIVE BOX
GI Followup Note:   Pt seen and examined at bedside.   78y year old  Male seen  by GI  for PEG placement for dysphagia 2/2 L vocal cord injury 2/2 ?nerve injury? to L anterior approach zencker diverticulotomy.    Interval hx:  Pt is SP Neck Ct w IV contrast: < from: CT Neck Soft Tissue w/ IV Cont (03.14.18 @ 11:28) >  There are linear hyperdensities likely reflecting surgery as   along the posterior wall of the hypopharynx and proximal esophagus. There   is abnormal soft tissue at the level of the hypopharynx measuring up to   2.0 x 1.0 cm on series 4 image 61. The left aryepiglottic fold appears   mildly again/edematous.    < end of copied text >    ENT eval: Left vocal cord diminished motion seen on exam today, probably related to dysphagia and dysphonia.  Recommend CT scan of the neck with contrast to plan next step in treatment. possible option of vocal cord injection with calcium hydroxyapetite.       Pt is currently on TPN.  NPO.  Reluctant about the PEG.  No complaints otherwise.      REVIEW OF SYSTEMS:  Constitutional: No fever, weight loss or fatigue  Cardiovascular: No chest pain, palpitations, dizziness or leg swelling  Gastrointestinal: No abdominal or epigastric pain. No nausea, vomiting or hematemesis; No diarrhea or constipation. No melena or hematochezia.  Skin: No itching, burning, rashes or lesions     Allergies: No Known Allergies      Medications:  enoxaparin Injectable 40 milliGRAM(s) SubCutaneous every 24 hours  fat emulsion (Plant Based) 20% Infusion 1.93 Gm/kG/Day IV Continuous <Continuous>  Parenteral Nutrition - Adult 1 Each TPN Continuous <Continuous>      PHYSICAL EXAM:    Vital Signs Last 24 Hrs  T(C): 37 (15 Mar 2018 07:12), Max: 37 (15 Mar 2018 07:12)  T(F): 98.6 (15 Mar 2018 07:12), Max: 98.6 (15 Mar 2018 07:12)  HR: 76 (15 Mar 2018 07:12) (65 - 87)  BP: 118/56 (15 Mar 2018 07:12) (114/68 - 118/56)  RR: 18 (15 Mar 2018 07:12) (18 - 18)    03-14 @ 07:01  -  03-15 @ 07:00  --------------------------------------------------------  IN: 877.6 mL / OUT: 0 mL / NET: 877.6 mL    General: Well developed; well nourished; in no acute distress  HEENT: MMM, conjunctiva and sclera clear- coarse voice  Lungs: Clear, no Rhonchi  Gastrointestinal: Soft non-tender non-distended; Normal bowel sounds; No hepatosplenomegaly. No rebound or guarding  Skin: Warm and dry. No obvious rash    LABS:                        13.1   6.32  )-----------( 336      ( 14 Mar 2018 07:34 )             39.6     13.103-14-18 @ 07:34  14.503-13-18 @ 08:27  14.103-12-18 @ 12:29    03-14    137  |  104  |  13  ----------------------------<  62<L>  3.8   |  23  |  1.0    Ca    9.5      14 Mar 2018 07:34      PTT - ( 14 Mar 2018 07:34 )  PTT:42.7 sec                        Impression; 78y  year old Male with :    Differential includes    Recommendations: GI Followup Note:   Pt seen and examined at bedside.   78y year old  Male seen  by GI  for PEG placement for dysphagia 2/2 L vocal cord injury 2/2 ?nerve injury? to L anterior approach zencker diverticulotomy.    Interval hx:  Pt is SP Neck Ct w IV contrast: < from: CT Neck Soft Tissue w/ IV Cont (03.14.18 @ 11:28) >  There are linear hyperdensities likely reflecting surgery as   along the posterior wall of the hypopharynx and proximal esophagus. There   is abnormal soft tissue at the level of the hypopharynx measuring up to   2.0 x 1.0 cm on series 4 image 61. The left aryepiglottic fold appears   mildly again/edematous.    < end of copied text >    ENT eval: Left vocal cord diminished motion seen on exam today, probably related to dysphagia and dysphonia.  Recommend CT scan of the neck with contrast to plan next step in treatment. possible option of vocal cord injection with calcium hydroxyapetite.       Pt is currently on TPN.  NPO.  Reluctant about the PEG.  No complaints otherwise.      REVIEW OF SYSTEMS:  Constitutional: No fever, weight loss or fatigue  Cardiovascular: No chest pain, palpitations, dizziness or leg swelling  Gastrointestinal: No abdominal or epigastric pain. No nausea, vomiting or hematemesis; No diarrhea or constipation. No melena or hematochezia.  Skin: No itching, burning, rashes or lesions     Allergies: No Known Allergies      Medications:  enoxaparin Injectable 40 milliGRAM(s) SubCutaneous every 24 hours  fat emulsion (Plant Based) 20% Infusion 1.93 Gm/kG/Day IV Continuous <Continuous>  Parenteral Nutrition - Adult 1 Each TPN Continuous <Continuous>      PHYSICAL EXAM:    Vital Signs Last 24 Hrs  T(C): 37 (15 Mar 2018 07:12), Max: 37 (15 Mar 2018 07:12)  T(F): 98.6 (15 Mar 2018 07:12), Max: 98.6 (15 Mar 2018 07:12)  HR: 76 (15 Mar 2018 07:12) (65 - 87)  BP: 118/56 (15 Mar 2018 07:12) (114/68 - 118/56)  RR: 18 (15 Mar 2018 07:12) (18 - 18)    03-14 @ 07:01  -  03-15 @ 07:00  --------------------------------------------------------  IN: 877.6 mL / OUT: 0 mL / NET: 877.6 mL    General: Well developed; well nourished; in no acute distress  HEENT: MMM, conjunctiva and sclera clear- coarse voice  Lungs: Clear, no Rhonchi  Gastrointestinal: Soft non-tender non-distended; Normal bowel sounds; No hepatosplenomegaly. No rebound or guarding  Skin: Warm and dry. No obvious rash    LABS:                        13.1   6.32  )-----------( 336      ( 14 Mar 2018 07:34 )             39.6     13.103-14-18 @ 07:34  14.503-13-18 @ 08:27  14.103-12-18 @ 12:29    03-14    137  |  104  |  13  ----------------------------<  62<L>  3.8   |  23  |  1.0    Ca    9.5      14 Mar 2018 07:34      PTT - ( 14 Mar 2018 07:34 )  PTT:42.7 sec GI Followup Note:   Pt seen and examined at bedside.   78y year old  Male seen  by GI  for consideration for PEG placement for dysphagia 2/2 L vocal cord injury 2/2 ?nerve injury? to L anterior approach zencker diverticulotomy.    Interval hx:  Pt is SP Neck Ct w IV contrast: < from: CT Neck Soft Tissue w/ IV Cont (03.14.18 @ 11:28) >  There are linear hyperdensities likely reflecting surgery as   along the posterior wall of the hypopharynx and proximal esophagus. There   is abnormal soft tissue at the level of the hypopharynx measuring up to   2.0 x 1.0 cm on series 4 image 61. The left aryepiglottic fold appears   mildly again/edematous.    < end of copied text >    ENT eval: Left vocal cord diminished motion seen on exam today, probably related to dysphagia and dysphonia.  Recommend CT scan of the neck with contrast to plan next step in treatment. possible option of vocal cord injection with calcium hydroxyapetite.       Pt is currently on PPN.  NPO.  Reluctant about the PEG.  No complaints otherwise.      REVIEW OF SYSTEMS:  Constitutional: No fever, weight loss or fatigue  Cardiovascular: No chest pain, palpitations, dizziness or leg swelling  Gastrointestinal: No abdominal or epigastric pain. No nausea, vomiting or hematemesis; No diarrhea or constipation. No melena or hematochezia.  Skin: No itching, burning, rashes or lesions     Allergies: No Known Allergies      Medications:  enoxaparin Injectable 40 milliGRAM(s) SubCutaneous every 24 hours  fat emulsion (Plant Based) 20% Infusion 1.93 Gm/kG/Day IV Continuous <Continuous>  Parenteral Nutrition - Adult 1 Each TPN Continuous <Continuous>      PHYSICAL EXAM:    Vital Signs Last 24 Hrs  T(C): 37 (15 Mar 2018 07:12), Max: 37 (15 Mar 2018 07:12)  T(F): 98.6 (15 Mar 2018 07:12), Max: 98.6 (15 Mar 2018 07:12)  HR: 76 (15 Mar 2018 07:12) (65 - 87)  BP: 118/56 (15 Mar 2018 07:12) (114/68 - 118/56)  RR: 18 (15 Mar 2018 07:12) (18 - 18)    03-14 @ 07:01  -  03-15 @ 07:00  --------------------------------------------------------  IN: 877.6 mL / OUT: 0 mL / NET: 877.6 mL    General: thin, in no acute distress  HEENT: MMM, conjunctiva and sclera clear- hoarse voice  Lungs: Clear, no Rhonchi  Gastrointestinal: Soft non-tender non-distended; Normal bowel sounds; No hepatosplenomegaly. No rebound or guarding  Skin: Warm and dry. No obvious rash    LABS:                        13.1   6.32  )-----------( 336      ( 14 Mar 2018 07:34 )             39.6     13.103-14-18 @ 07:34  14.503-13-18 @ 08:27  14.103-12-18 @ 12:29    03-14    137  |  104  |  13  ----------------------------<  62<L>  3.8   |  23  |  1.0    Ca    9.5      14 Mar 2018 07:34      PTT - ( 14 Mar 2018 07:34 )  PTT:42.7 sec

## 2018-03-16 LAB
ANION GAP SERPL CALC-SCNC: 15 MMOL/L — HIGH (ref 7–14)
BUN SERPL-MCNC: 12 MG/DL — SIGNIFICANT CHANGE UP (ref 10–20)
CALCIUM SERPL-MCNC: 8.8 MG/DL — SIGNIFICANT CHANGE UP (ref 8.5–10.1)
CHLORIDE SERPL-SCNC: 97 MMOL/L — LOW (ref 98–110)
CO2 SERPL-SCNC: 25 MMOL/L — SIGNIFICANT CHANGE UP (ref 17–32)
CREAT SERPL-MCNC: 0.8 MG/DL — SIGNIFICANT CHANGE UP (ref 0.7–1.5)
GLUCOSE SERPL-MCNC: 81 MG/DL — SIGNIFICANT CHANGE UP (ref 70–110)
HCT VFR BLD CALC: 40.1 % — LOW (ref 42–52)
HGB BLD-MCNC: 13.8 G/DL — LOW (ref 14–18)
MAGNESIUM SERPL-MCNC: 2 MG/DL — SIGNIFICANT CHANGE UP (ref 1.8–2.4)
MCHC RBC-ENTMCNC: 30 PG — SIGNIFICANT CHANGE UP (ref 27–31)
MCHC RBC-ENTMCNC: 34.4 G/DL — SIGNIFICANT CHANGE UP (ref 32–37)
MCV RBC AUTO: 87.2 FL — SIGNIFICANT CHANGE UP (ref 80–94)
NRBC # BLD: 0 /100 WBCS — SIGNIFICANT CHANGE UP (ref 0–0)
PHOSPHATE SERPL-MCNC: 3 MG/DL — SIGNIFICANT CHANGE UP (ref 2.1–4.9)
PLATELET # BLD AUTO: 366 K/UL — SIGNIFICANT CHANGE UP (ref 130–400)
POTASSIUM SERPL-MCNC: 4.2 MMOL/L — SIGNIFICANT CHANGE UP (ref 3.5–5)
POTASSIUM SERPL-SCNC: 4.2 MMOL/L — SIGNIFICANT CHANGE UP (ref 3.5–5)
RBC # BLD: 4.6 M/UL — LOW (ref 4.7–6.1)
RBC # FLD: 14.6 % — HIGH (ref 11.5–14.5)
SODIUM SERPL-SCNC: 137 MMOL/L — SIGNIFICANT CHANGE UP (ref 135–146)
WBC # BLD: 7.2 K/UL — SIGNIFICANT CHANGE UP (ref 4.8–10.8)
WBC # FLD AUTO: 7.2 K/UL — SIGNIFICANT CHANGE UP (ref 4.8–10.8)

## 2018-03-16 RX ORDER — I.V. FAT EMULSION 20 G/100ML
1.93 EMULSION INTRAVENOUS
Qty: 111.17 | Refills: 0 | Status: DISCONTINUED | OUTPATIENT
Start: 2018-03-17 | End: 2018-03-17

## 2018-03-16 RX ORDER — I.V. FAT EMULSION 20 G/100ML
1.93 EMULSION INTRAVENOUS
Qty: 111.17 | Refills: 0 | Status: DISCONTINUED | OUTPATIENT
Start: 2018-03-16 | End: 2018-03-16

## 2018-03-16 RX ORDER — ELECTROLYTE SOLUTION,INJ
1 VIAL (ML) INTRAVENOUS
Qty: 0 | Refills: 0 | Status: DISCONTINUED | OUTPATIENT
Start: 2018-03-18 | End: 2018-03-18

## 2018-03-16 RX ORDER — ELECTROLYTE SOLUTION,INJ
1 VIAL (ML) INTRAVENOUS
Qty: 0 | Refills: 0 | Status: DISCONTINUED | OUTPATIENT
Start: 2018-03-17 | End: 2018-03-17

## 2018-03-16 RX ORDER — ELECTROLYTE SOLUTION,INJ
1 VIAL (ML) INTRAVENOUS
Qty: 0 | Refills: 0 | Status: DISCONTINUED | OUTPATIENT
Start: 2018-03-16 | End: 2018-03-16

## 2018-03-16 RX ORDER — I.V. FAT EMULSION 20 G/100ML
1.93 EMULSION INTRAVENOUS
Qty: 111.17 | Refills: 0 | Status: DISCONTINUED | OUTPATIENT
Start: 2018-03-18 | End: 2018-03-18

## 2018-03-16 RX ADMIN — Medication 75 EACH: at 21:22

## 2018-03-16 RX ADMIN — I.V. FAT EMULSION 34.74 GM/KG/DAY: 20 EMULSION INTRAVENOUS at 21:20

## 2018-03-16 RX ADMIN — ENOXAPARIN SODIUM 40 MILLIGRAM(S): 100 INJECTION SUBCUTANEOUS at 05:53

## 2018-03-16 NOTE — PROGRESS NOTE ADULT - SUBJECTIVE AND OBJECTIVE BOX
GI Followup Note:   Pt seen and examined at bedside.   78y year old  Male seen  by GI  for consideration for PEG placement for dysphagia 2/2 L vocal cord injury 2/2 ?nerve injury? to L anterior approach zencker diverticulotomy.    subjective:  Remains on PPN.  More accepting of PEG tube placement.  Feels some improvement in his voice.    REVIEW OF SYSTEMS:  Constitutional: No fever, weight loss or fatigue  Cardiovascular: No chest pain, palpitations, dizziness or leg swelling  Gastrointestinal: No abdominal or epigastric pain. No nausea, vomiting or hematemesis; No diarrhea or constipation. No melena or hematochezia.  Skin: No itching, burning, rashes or lesions     Allergies: No Known Allergies      Medications:   enoxaparin Injectable 40 milliGRAM(s) SubCutaneous every 24 hours  fat emulsion (Plant Based) 20% Infusion 1.93 Gm/kG/Day IV Continuous <Continuous>  Parenteral Nutrition - Adult 1 Each TPN Continuous <Continuous>  Parenteral Nutrition - Adult 1 Each TPN Continuous <Continuous>      PHYSICAL EXAM:    Vital Signs Last 24 Hrs  T(C): 36.1 (16 Mar 2018 15:00), Max: 36.1 (16 Mar 2018 15:00)  T(F): 96.9 (16 Mar 2018 15:00), Max: 96.9 (16 Mar 2018 15:00)  HR: 70 (16 Mar 2018 15:00) (68 - 75)  BP: 124/69 (16 Mar 2018 15:00) (111/69 - 133/66)  BP(mean): --  RR: 18 (16 Mar 2018 15:00) (18 - 19)  SpO2: --    03-15 @ 07:01  -  03-16 @ 07:00  --------------------------------------------------------  IN: 1445 mL / OUT: 200 mL / NET: 1245 mL        General: Well developed; well nourished; in no acute distress  HEENT: MMM, conjunctiva and sclera clear  Lungs: Clear, no Rhonchi  Gastrointestinal: Soft non-tender non-distended; Normal bowel sounds; No hepatosplenomegaly. No rebound or guarding  Skin: Warm and dry. No obvious rash    LABS:                        13.8   7.20  )-----------( 366      ( 16 Mar 2018 07:12 )             40.1     13.803-16-18 @ 07:12  13.103-14-18 @ 07:34    03-16    137  |  97<L>  |  12  ----------------------------<  81  4.2   |  25  |  0.8    Ca    8.8      16 Mar 2018 07:12  Phos  3.0     03-16  Mg     2.0     03-16 GI Followup Note:   Pt seen and examined at bedside.   78y year old  Male seen  by GI  for consideration for PEG placement for dysphagia 2/2 L vocal cord injury 2/2 ?nerve injury? to L anterior approach zenker diverticulotomy.    subjective:  Remains on PPN.  More accepting of PEG tube placement.  Feels some improvement in his voice.    REVIEW OF SYSTEMS:  Constitutional: No fever, weight loss or fatigue  Cardiovascular: No chest pain, palpitations, dizziness or leg swelling  Gastrointestinal: No abdominal or epigastric pain. No nausea, vomiting or hematemesis; No diarrhea or constipation. No melena or hematochezia.  Skin: No itching, burning, rashes or lesions     Allergies: No Known Allergies      Medications:   enoxaparin Injectable 40 milliGRAM(s) SubCutaneous every 24 hours  fat emulsion (Plant Based) 20% Infusion 1.93 Gm/kG/Day IV Continuous <Continuous>  Parenteral Nutrition - Adult 1 Each TPN Continuous <Continuous>  Parenteral Nutrition - Adult 1 Each TPN Continuous <Continuous>      PHYSICAL EXAM:    Vital Signs Last 24 Hrs  T(C): 36.1 (16 Mar 2018 15:00), Max: 36.1 (16 Mar 2018 15:00)  T(F): 96.9 (16 Mar 2018 15:00), Max: 96.9 (16 Mar 2018 15:00)  HR: 70 (16 Mar 2018 15:00) (68 - 75)  BP: 124/69 (16 Mar 2018 15:00) (111/69 - 133/66)  BP(mean): --  RR: 18 (16 Mar 2018 15:00) (18 - 19)  SpO2: --    03-15 @ 07:01  -  03-16 @ 07:00  --------------------------------------------------------  IN: 1445 mL / OUT: 200 mL / NET: 1245 mL        General: Well developed; well nourished; in no acute distress  HEENT: MMM, conjunctiva and sclera clear  Lungs: Clear, no Rhonchi  Gastrointestinal: Soft non-tender non-distended; Normal bowel sounds; No hepatosplenomegaly. No rebound or guarding  Skin: Warm and dry. No obvious rash    LABS:                        13.8   7.20  )-----------( 366      ( 16 Mar 2018 07:12 )             40.1     13.803-16-18 @ 07:12  13.103-14-18 @ 07:34    03-16    137  |  97<L>  |  12  ----------------------------<  81  4.2   |  25  |  0.8    Ca    8.8      16 Mar 2018 07:12  Phos  3.0     03-16  Mg     2.0     03-16

## 2018-03-16 NOTE — PROGRESS NOTE ADULT - SUBJECTIVE AND OBJECTIVE BOX
SUBJECTIVE:Patient is a 78y old  Male who presents with a chief complaint of dysphasia (12 Mar 2018 22:36)  Today is hospital day 4d. Patient seen and examined. NAD. Spoken with ENTcalvin for PEG and OP follow up, awaiting documentation in chart. GI follow up for PEG. Continue PPN and follow up.     PAST MEDICAL & SURGICAL HISTORY  PAST MEDICAL & SURGICAL HISTORY:  Zenker diverticulum  High cholesterol  HTN (hypertension)  H/O excision of Zenker's diverticulum    ALLERGIES:  No Known Allergies    MEDICATIONS:  STANDING MEDICATIONS  enoxaparin Injectable 40 milliGRAM(s) SubCutaneous every 24 hours  Parenteral Nutrition - Adult 1 Each TPN Continuous <Continuous>    PRN MEDICATIONS    VITALS:   T(F): 95.7  HR: 75  BP: 133/66  RR: 19  SpO2: --    LABS:                        13.8   7.20  )-----------( 366      ( 16 Mar 2018 07:12 )             40.1     03-16    137  |  97<L>  |  12  ----------------------------<  81  4.2   |  25  |  0.8    Ca    8.8      16 Mar 2018 07:12  Phos  3.0     03-16  Mg     2.0     03-16      PHYSICAL EXAM:  GEN: NAD, Cachetic  LUNGS: Clear to auscultation bilaterally   HEART: S1/S2 present. RRR.   ABD: Soft, non-tender, non-distended. Bowel sounds present  EXT: no b/l LE edema

## 2018-03-16 NOTE — CHART NOTE - NSCHARTNOTEFT_GEN_A_CORE
No new complaints. Reports feeling less weak since PPN started  D/W ENT, plan for outpatient treatment of vocal cord paralysis and PEG recommended for feeding  Remains NPO now, PPN day #2    03-16    137  |  97<L>  |  12  ----------------------------<  81  4.2   |  25  |  0.8    Ca    8.8      16 Mar 2018 07:12  Phos  3.0     03-16  Mg     2.0     03-16                          13.8   7.20  )-----------( 366      ( 16 Mar 2018 07:12 )             40.1     Vitamin D, 25-Hydroxy (03.14.18 @ 17:28)    Vitamin D, 25-Hydroxy: 33.4: 30 - 80 ng/mL                                        Optimum Levels  (Reference range)  > 80 ng/mL                                             Toxicity possible  20 - 29 ng/mL                                         Insufficiency  10 - 19 ng/mL                                 Mild to Moderate  Deficiency  < 10 ng/mL                                             Severe Deficiency     Triglycerides, Serum (03.14.18 @ 17:28)    Triglycerides, Serum: 102 mg/dL    PLAN: Continue with PPN for now  GI to schedule PEG placement ? early next week per conversation yesterday  Once PEG placed and OK to use start Jevity 1.2 240ml 4X/day to start  Check vitamin B-12 level

## 2018-03-16 NOTE — PROGRESS NOTE ADULT - ASSESSMENT
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 Pt prefers to have the VC injections performed as OP. In the meantime pt will be scheduled for PEG placement for safe alimentation. There is no contraindication to have PEG placed endoscopically rom ENT standpoint.  reviewed the CT neck, no abnormality noted except postoperative changes. Recall prn

## 2018-03-16 NOTE — PROGRESS NOTE ADULT - SUBJECTIVE AND OBJECTIVE BOX
79 yo male with left VC paralysis, aspiration on MBS, currently on PPN   no events noted over night     pt seen and examined at bedside  nc/at, left neck incision healing well  pt has weak and breathy vocal quality    Vital Signs Last 24 Hrs  T(C): 35.4 (16 Mar 2018 07:32), Max: 35.8 (15 Mar 2018 23:29)  T(F): 95.7 (16 Mar 2018 07:32), Max: 96.4 (15 Mar 2018 23:29)  HR: 75 (16 Mar 2018 07:32) (64 - 75)  BP: 133/66 (16 Mar 2018 07:32) (111/69 - 133/66)  RR: 19 (16 Mar 2018 07:32) (18 - 19)    < from: CT Neck Soft Tissue w/ IV Cont (03.14.18 @ 11:28) >  1.  Patient is status post zenker diverticulum repair as per history with   surgical material noted along the posterior wall of the hypopharynx and   proximal esophagus.    2.  Abnormalsoft tissue at the level of the hypopharynx measuring up to   2 x 1 cm (ser 4 image 61). Mild thickening of the left aryepiglottic   fold. Recommend correlation with direct visualization.    3.  Right frontal and ethmoid sinus mucosal disease.

## 2018-03-16 NOTE — PROGRESS NOTE ADULT - ASSESSMENT
79 Yo M SP L anterior approach Zencker diverticulotomy with altered anatomy in the surgical site/ L vocal cord edema followed up by ENT.  ENT thinks that he needs a safe means of nutrition and discussed PEG placement with the patient who now is agreeable to it. As per ENT there are no contraindications. Hydroxaapetite injection to the vocal cords by ENT is a long term attempt at regaining some vocal cord function. It will be done OP. Currently on PPN.    Plan:  - Parenteral feeding tube is needed as there is no expected improvement in his swallowing in the near future we will inform anasthesia and plan for placement on Monday.

## 2018-03-16 NOTE — PROGRESS NOTE ADULT - ASSESSMENT
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 He was today at the speech and swallow therapist and his swallow testing was abnormal so he was referred to the hospital for further evaluation. He reports also weight loss of 20 pounds after the surgery.     IMPRESSION:    # Residual Dysphagia s/p Surgical excision of zenker diverticulum ( oropharyngeal vs esophageal)  s/p CT neck and chest with IV contrast for soft tissue noted  GI recommendations noted and appreciated: ENT follow to see if any intervention can be done to avoid PEG. If not will proceed with PEG. Will also need clearance from ENT to do PEG.  ENT consulted: FFL exam completed. Spoke with ENT. Can follow up as outpatient. pEG tube can be done via GI as per them, awaiting note in chart.   Speech eval recommend NPO diet for now  Information release form for medical records from [facility] - sent, f/u for arrival of records, still has not arrived.     # Weight loss of more than 10% body weight:   Nutrition evaluation noted. Continue PPN and follow up with Nutrition today.    #  HTN (hypertension): Switched candesartan to Losartan.     # Hypercholesterolemia: continue simvastatin

## 2018-03-17 LAB
ANION GAP SERPL CALC-SCNC: 13 MMOL/L — SIGNIFICANT CHANGE UP (ref 7–14)
APTT BLD: 33.1 SEC — SIGNIFICANT CHANGE UP (ref 27–39.2)
BLD GP AB SCN SERPL QL: SIGNIFICANT CHANGE UP
BUN SERPL-MCNC: 13 MG/DL — SIGNIFICANT CHANGE UP (ref 10–20)
CALCIUM SERPL-MCNC: 9.2 MG/DL — SIGNIFICANT CHANGE UP (ref 8.5–10.1)
CHLORIDE SERPL-SCNC: 99 MMOL/L — SIGNIFICANT CHANGE UP (ref 98–110)
CO2 SERPL-SCNC: 26 MMOL/L — SIGNIFICANT CHANGE UP (ref 17–32)
CREAT SERPL-MCNC: 0.8 MG/DL — SIGNIFICANT CHANGE UP (ref 0.7–1.5)
GLUCOSE SERPL-MCNC: 83 MG/DL — SIGNIFICANT CHANGE UP (ref 70–110)
HCT VFR BLD CALC: 42.3 % — SIGNIFICANT CHANGE UP (ref 42–52)
HGB BLD-MCNC: 14 G/DL — SIGNIFICANT CHANGE UP (ref 14–18)
INR BLD: 1.13 RATIO — SIGNIFICANT CHANGE UP (ref 0.65–1.3)
MAGNESIUM SERPL-MCNC: 2.3 MG/DL — SIGNIFICANT CHANGE UP (ref 1.8–2.4)
MCHC RBC-ENTMCNC: 28.9 PG — SIGNIFICANT CHANGE UP (ref 27–31)
MCHC RBC-ENTMCNC: 33.1 G/DL — SIGNIFICANT CHANGE UP (ref 32–37)
MCV RBC AUTO: 87.4 FL — SIGNIFICANT CHANGE UP (ref 80–94)
NRBC # BLD: 0 /100 WBCS — SIGNIFICANT CHANGE UP (ref 0–0)
PHOSPHATE SERPL-MCNC: 3.2 MG/DL — SIGNIFICANT CHANGE UP (ref 2.1–4.9)
PLATELET # BLD AUTO: 316 K/UL — SIGNIFICANT CHANGE UP (ref 130–400)
POTASSIUM SERPL-MCNC: 4.6 MMOL/L — SIGNIFICANT CHANGE UP (ref 3.5–5)
POTASSIUM SERPL-SCNC: 4.6 MMOL/L — SIGNIFICANT CHANGE UP (ref 3.5–5)
PROTHROM AB SERPL-ACNC: 12.2 SEC — SIGNIFICANT CHANGE UP (ref 9.95–12.87)
RBC # BLD: 4.84 M/UL — SIGNIFICANT CHANGE UP (ref 4.7–6.1)
RBC # FLD: 14.6 % — HIGH (ref 11.5–14.5)
SODIUM SERPL-SCNC: 138 MMOL/L — SIGNIFICANT CHANGE UP (ref 135–146)
TYPE + AB SCN PNL BLD: SIGNIFICANT CHANGE UP
WBC # BLD: 7.14 K/UL — SIGNIFICANT CHANGE UP (ref 4.8–10.8)
WBC # FLD AUTO: 7.14 K/UL — SIGNIFICANT CHANGE UP (ref 4.8–10.8)

## 2018-03-17 RX ADMIN — I.V. FAT EMULSION 34.74 GM/KG/DAY: 20 EMULSION INTRAVENOUS at 20:41

## 2018-03-17 RX ADMIN — Medication 1 EACH: at 20:40

## 2018-03-17 NOTE — PROGRESS NOTE ADULT - ASSESSMENT
1. Residual Dysphagia s/p Surgical excision of zenker diverticulum ( oropharyngeal vs esophageal)  Speech eval recommend NPO diet for now  Planned PEG tube Monday    2. Weight loss/ Cachexia  Continue PPN    3. HTN, HLD on Tx

## 2018-03-18 LAB
ANION GAP SERPL CALC-SCNC: 15 MMOL/L — HIGH (ref 7–14)
BUN SERPL-MCNC: 12 MG/DL — SIGNIFICANT CHANGE UP (ref 10–20)
CALCIUM SERPL-MCNC: 8.9 MG/DL — SIGNIFICANT CHANGE UP (ref 8.5–10.1)
CHLORIDE SERPL-SCNC: 102 MMOL/L — SIGNIFICANT CHANGE UP (ref 98–110)
CO2 SERPL-SCNC: 20 MMOL/L — SIGNIFICANT CHANGE UP (ref 17–32)
CREAT SERPL-MCNC: 0.8 MG/DL — SIGNIFICANT CHANGE UP (ref 0.7–1.5)
GLUCOSE SERPL-MCNC: 92 MG/DL — SIGNIFICANT CHANGE UP (ref 70–110)
HCT VFR BLD CALC: 38.3 % — LOW (ref 42–52)
HGB BLD-MCNC: 13.1 G/DL — LOW (ref 14–18)
MAGNESIUM SERPL-MCNC: 2.2 MG/DL — SIGNIFICANT CHANGE UP (ref 1.8–2.4)
MCHC RBC-ENTMCNC: 29.7 PG — SIGNIFICANT CHANGE UP (ref 27–31)
MCHC RBC-ENTMCNC: 34.2 G/DL — SIGNIFICANT CHANGE UP (ref 32–37)
MCV RBC AUTO: 86.8 FL — SIGNIFICANT CHANGE UP (ref 80–94)
NRBC # BLD: 0 /100 WBCS — SIGNIFICANT CHANGE UP (ref 0–0)
PHOSPHATE SERPL-MCNC: 3.5 MG/DL — SIGNIFICANT CHANGE UP (ref 2.1–4.9)
PLATELET # BLD AUTO: 311 K/UL — SIGNIFICANT CHANGE UP (ref 130–400)
POTASSIUM SERPL-MCNC: 4.6 MMOL/L — SIGNIFICANT CHANGE UP (ref 3.5–5)
POTASSIUM SERPL-SCNC: 4.6 MMOL/L — SIGNIFICANT CHANGE UP (ref 3.5–5)
RBC # BLD: 4.41 M/UL — LOW (ref 4.7–6.1)
RBC # FLD: 14.6 % — HIGH (ref 11.5–14.5)
SODIUM SERPL-SCNC: 137 MMOL/L — SIGNIFICANT CHANGE UP (ref 135–146)
WBC # BLD: 6.22 K/UL — SIGNIFICANT CHANGE UP (ref 4.8–10.8)
WBC # FLD AUTO: 6.22 K/UL — SIGNIFICANT CHANGE UP (ref 4.8–10.8)

## 2018-03-18 RX ADMIN — ENOXAPARIN SODIUM 40 MILLIGRAM(S): 100 INJECTION SUBCUTANEOUS at 08:05

## 2018-03-18 RX ADMIN — Medication 1 EACH: at 20:35

## 2018-03-18 RX ADMIN — I.V. FAT EMULSION 34.74 GM/KG/DAY: 20 EMULSION INTRAVENOUS at 20:35

## 2018-03-18 NOTE — PROGRESS NOTE ADULT - SUBJECTIVE AND OBJECTIVE BOX
Pt seen and examined. Wife at bedside    T(F): , Max: 97.4 (03-18-18 @ 15:56)  HR: 74 (03-18-18 @ 15:56) (62 - 74)  BP: 130/72 (03-18-18 @ 15:56)  RR: 18 (03-18-18 @ 15:56)  SpO2: 95% (03-17-18 @ 23:21)  IN: 955 mL / OUT: 0 mL / NET: 955 mL    IN: 1180 mL / OUT: 1200 mL / NET: -20 mL      General: No apparent distress, anxious appearing  Cardiovascular: S1, S2  Gastrointestinal: Soft, Non-tender, Non-distended  Respiratory: Good air entry bilaterally  Musculoskeletal: Moves all extremities  Lymphatic: No edema  Neurologic: Hoarse voice  Dermatologic: Skin dry  PT/INR - ( 17 Mar 2018 14:36 )   PT: 12.20 sec;   INR: 1.13 ratio         PTT - ( 17 Mar 2018 14:36 )  PTT:33.1 sec                        13.1   6.22  )-----------( 311      ( 18 Mar 2018 09:03 )             38.3     03-18    137  |  102  |  12  ----------------------------<  92  4.6   |  20  |  0.8    Ca    8.9      18 Mar 2018 09:03  Phos  3.5     03-18  Mg     2.2     03-18

## 2018-03-18 NOTE — PROGRESS NOTE ADULT - ASSESSMENT
Dysphagia s/p zenker diverticulum sx - PEG in AM, on PPN    25lb weight loss - 2/2 dysphagia    HTN - controlled

## 2018-03-19 ENCOUNTER — TRANSCRIPTION ENCOUNTER (OUTPATIENT)
Age: 78
End: 2018-03-19

## 2018-03-19 RX ORDER — OXYCODONE AND ACETAMINOPHEN 5; 325 MG/1; MG/1
1 TABLET ORAL ONCE
Qty: 0 | Refills: 0 | Status: DISCONTINUED | OUTPATIENT
Start: 2018-03-19 | End: 2018-03-19

## 2018-03-19 RX ORDER — ELECTROLYTE SOLUTION,INJ
1 VIAL (ML) INTRAVENOUS
Qty: 0 | Refills: 0 | Status: DISCONTINUED | OUTPATIENT
Start: 2018-03-19 | End: 2018-03-19

## 2018-03-19 RX ORDER — ACETAMINOPHEN 500 MG
650 TABLET ORAL ONCE
Qty: 0 | Refills: 0 | Status: COMPLETED | OUTPATIENT
Start: 2018-03-19 | End: 2018-03-19

## 2018-03-19 RX ORDER — I.V. FAT EMULSION 20 G/100ML
1.93 EMULSION INTRAVENOUS
Qty: 111.17 | Refills: 0 | Status: DISCONTINUED | OUTPATIENT
Start: 2018-03-19 | End: 2018-03-19

## 2018-03-19 RX ORDER — SIMVASTATIN 20 MG/1
20 TABLET, FILM COATED ORAL AT BEDTIME
Qty: 0 | Refills: 0 | Status: DISCONTINUED | OUTPATIENT
Start: 2018-03-19 | End: 2018-03-22

## 2018-03-19 RX ORDER — PANTOPRAZOLE SODIUM 20 MG/1
40 TABLET, DELAYED RELEASE ORAL
Qty: 0 | Refills: 0 | Status: DISCONTINUED | OUTPATIENT
Start: 2018-03-19 | End: 2018-03-22

## 2018-03-19 RX ADMIN — Medication 650 MILLIGRAM(S): at 21:03

## 2018-03-19 RX ADMIN — SIMVASTATIN 20 MILLIGRAM(S): 20 TABLET, FILM COATED ORAL at 22:45

## 2018-03-19 RX ADMIN — Medication 1 EACH: at 20:52

## 2018-03-19 RX ADMIN — I.V. FAT EMULSION 34.74 GM/KG/DAY: 20 EMULSION INTRAVENOUS at 20:53

## 2018-03-19 NOTE — DISCHARGE NOTE ADULT - ADDITIONAL INSTRUCTIONS
Please note on CT Scan we saw the following:  < from: CT Abdomen and Pelvis w/ IV Cont (03.20.18 @ 15:44) >  2.  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 Outpatient  orthopedic follow up is suggested. < end of copied text >  Please follow up with your regular Orthopedist within 2 weeks of discharge.

## 2018-03-19 NOTE — DISCHARGE NOTE ADULT - MEDICATION SUMMARY - MEDICATIONS TO STOP TAKING
I will STOP taking the medications listed below when I get home from the hospital:  None I will STOP taking the medications listed below when I get home from the hospital:    candesartan 32 mg oral tablet  -- 1 tab(s) by mouth once a day I will STOP taking the medications listed below when I get home from the hospital:    candesartan 32 mg oral tablet  -- 1 tab(s) by mouth once a day    omeprazole 20 mg oral delayed release tablet  -- 1 tab(s) by mouth once a day

## 2018-03-19 NOTE — DISCHARGE NOTE ADULT - MEDICATION SUMMARY - MEDICATIONS TO TAKE
I will START or STAY ON the medications listed below when I get home from the hospital:    candesartan 32 mg oral tablet  -- 1 tab(s) by mouth once a day  -- Indication: For Hypertension     simvastatin 20 mg oral tablet  -- 1 tab(s) by mouth once a day (at bedtime)  -- Indication: For High cholestrol    amoxicillin-clavulanate 400 mg-57 mg/5 mL oral liquid  -- 10 milliliter(s) by mouth every 12 hours   -- Expires___________________  Finish all this medication unless otherwise directed by prescriber.  Refrigerate and shake well.  Expires_______________________  Take with food or milk.    -- Indication: For Aspiration pneumonia    omeprazole 20 mg oral delayed release tablet  -- 1 tab(s) by mouth once a day  -- Indication: For GERD I will START or STAY ON the medications listed below when I get home from the hospital:    simvastatin 20 mg oral tablet  -- 1 tab(s) by mouth once a day (at bedtime)  -- Indication: For High cholestrol    amoxicillin-clavulanate 400 mg-57 mg/5 mL oral liquid  -- 10 milliliter(s) by mouth every 12 hours   -- Expires___________________  Finish all this medication unless otherwise directed by prescriber.  Refrigerate and shake well.  Expires_______________________  Take with food or milk.    -- Indication: For pneumonia    omeprazole 20 mg oral delayed release tablet  -- 1 tab(s) by mouth once a day  -- Indication: For GERD I will START or STAY ON the medications listed below when I get home from the hospital:    simvastatin 20 mg oral tablet  -- 1 tab(s) by mouth once a day (at bedtime)  -- Indication: For High cholestrol    amoxicillin-clavulanate 400 mg-57 mg/5 mL oral liquid  -- 10 milliliter(s) by mouth every 12 hours   -- Expires___________________  Finish all this medication unless otherwise directed by prescriber.  Refrigerate and shake well.  Expires_______________________  Take with food or milk.    -- Indication: For pneumonia    omeprazole 2 mg/mL oral suspension  -- 10 milliliter(s) by gastrostomy tube once a day   -- Refrigerate and shake well.  Expires_______________________    -- Indication: For Dysphagia

## 2018-03-19 NOTE — DISCHARGE NOTE ADULT - PROVIDER TOKENS
TOKEN:'[id number]',TOKEN:'[id number]',TOKEN:'[id number]' TOKEN:'[id number]',TOKEN:'[id number]:'[id number]',TOKEN:'[id number]'

## 2018-03-19 NOTE — DISCHARGE NOTE ADULT - PATIENT PORTAL LINK FT
You can access the [facility], offered by [facility], by registering with the following website: [email or web address]

## 2018-03-19 NOTE — PROGRESS NOTE ADULT - ASSESSMENT
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 He was today at the speech and swallow therapist and his swallow testing was abnormal so he was referred to the hospital for further evaluation. He reports also weight loss of 20 pounds after the surgery.     IMPRESSION:    # Residual Dysphagia s/p Surgical excision of zenker diverticulum ( oropharyngeal vs esophageal)  s/p CT neck and chest with IV contrast for soft tissue noted  GI recommendations noted and appreciated: Planned for PEG tube placement today  ENT consulted: Follow up as outpatient  Speech eval recommend NPO diet   Information release form for medical records from [facility] - sent, f/u for arrival of records, still has not arrived.     # Weight loss of more than 10% body weight:   Nutrition evaluation noted. Continue PPN.    #  HTN (hypertension): Switched candesartan to Losartan.     # Hypercholesterolemia: continue simvastatin 79 yo M with hx of Zenker's Diverticulum s/p surgical resection in January 2018, HTN, DLD presenting for the evaluation of worsening dysphagia. He was today at the speech and swallow therapist and his swallow testing was abnormal so he was referred to the hospital for further evaluation. He reports also weight loss of 20 pounds after the surgery.     IMPRESSION:    # Residual Dysphagia s/p Surgical excision of zenker diverticulum ( oropharyngeal vs esophageal)  s/p CT neck and chest with IV contrast for soft tissue noted  GI recommendations noted and appreciated: Planned for PEG tube placement today  ENT consulted: Follow up as outpatient  Speech eval recommend NPO diet   Information release form for medical records from [facility] - sent, f/u for arrival of records, still has not arrived.     # Weight loss of more than 10% body weight:   Nutrition evaluation noted. Continue PPN and PEG feeds as per nutrition.     #  HTN (hypertension): Switched candesartan to Losartan.     # Hypercholesterolemia: continue simvastatin

## 2018-03-19 NOTE — DISCHARGE NOTE ADULT - PLAN OF CARE
Resolution of symtpoms PEG Tube has been inserted.  Please follow up with ENT, Dr. Shaikh, within 2 weeks of discharge for possible vocal cord injection. Resolution of symptoms Maintain regular cholesterol levels Continue Simvastatin Maintain regular blood pressure Continue home medications as prescribed. Gain appropriate amount of weight. Gastroenterology has placed PEG tube. Please follow up with Gastroenterologist, Dr. Ritter within two weeks of discharge.  Nutrition has started PEG tube feedings. Please continue feeds only through PEG Tube as instructed by nutrition. Please follow up with Nutrition within one week of discharge at the appointment scheduled for 4/3/18. Please follow up with your primary medical doctor within one week of discharge Resolution of infection Please continue Antibiotics as prescribed.   If symptoms worsen or reoccur, please call 911 or report to the nearest Emergency Medicine Department  Please follow up with Primary Medical Doctor within one week of discharge.  Please a repeat Chest X-Ray completed within 6 weeks of discharge to monitor for resolution of infection. PEG Tube has been inserted.  Please follow up with ENT, Dr. Shaikh, within 2 weeks of discharge for possible vocal cord injection. Also follow up with speech therapist at [facility]  Repeat lipid studies to check if you still need Simvastatin monitor BP at home and restart Cardesartan if BP high Please continue Antibiotics as prescribed. Aspiration precautions.  If symptoms worsen or reoccur, please call 911 or report to the nearest Emergency Medicine Department  Please follow up with Primary Medical Doctor within one week of discharge.  Please a repeat Chest X-Ray completed within 6 weeks of discharge to monitor for resolution of infection.

## 2018-03-19 NOTE — DISCHARGE NOTE ADULT - CARE PROVIDERS DIRECT ADDRESSES
,DirectAddress_Unknown,[email or web address],DirectAddress_Unknown ,DirectAddress_Unknown,[email or web address],DirectAddress_Unknown,[email or web address]

## 2018-03-19 NOTE — CHART NOTE - NSCHARTNOTEFT_GEN_A_CORE
PEG placement planned for today  NPO with PPN infusing  Afebrile VSS    03-18    137  |  102  |  12  ----------------------------<  92  4.6   |  20  |  0.8    Ca    8.9      18 Mar 2018 09:03  Phos  3.5     03-18  Mg     2.2     03-18                          13.1   6.22  )-----------( 311      ( 18 Mar 2018 09:03 )             38.3     Plan: Continue PPN tonight  Once PEG placed and Ok to use start feeds at 7am, noon, 5pm, 10pm with Jevity 120ml X 1 feed then increase to 240ml X 2 feeds. After tolerating 240ml X 2 feeds increase to goal of 360ml X 3 and 240ml X 1 feed/day  Once feeds tolerated at 240ml will d/c ppn  F/U vitamin B-12 level

## 2018-03-19 NOTE — PROGRESS NOTE ADULT - SUBJECTIVE AND OBJECTIVE BOX
SUBJECTIVE:Patient is a 78y old  Male who presents with a chief complaint of dysphasia (12 Mar 2018 22:36)  . Today is hospital day 7d     PAST MEDICAL & SURGICAL HISTORY  PAST MEDICAL & SURGICAL HISTORY:  Zenker diverticulum  High cholesterol  HTN (hypertension)  H/O excision of Zenker's diverticulum    ALLERGIES:  No Known Allergies    MEDICATIONS:  STANDING MEDICATIONS  Parenteral Nutrition - Adult 1 Each TPN Continuous <Continuous>    PRN MEDICATIONS    VITALS:   T(F): 96.5  HR: 74  BP: 146/82  RR: 18  SpO2: --    LABS:                        13.1   6.22  )-----------( 311      ( 18 Mar 2018 09:03 )             38.3     03-18    137  |  102  |  12  ----------------------------<  92  4.6   |  20  |  0.8    Ca    8.9      18 Mar 2018 09:03  Phos  3.5     03-18  Mg     2.2     03-18    PT/INR - ( 17 Mar 2018 14:36 )   PT: 12.20 sec;   INR: 1.13 ratio      PTT - ( 17 Mar 2018 14:36 )  PTT:33.1 sec    PHYSICAL EXAM:  GEN:   LUNGS: Clear to auscultation bilaterally   HEART: S1/S2 present. RRR.   ABD: Soft, non-tender, non-distended. Bowel sounds present  EXT: SUBJECTIVE:Patient is a 78y old  Male who presents with a chief complaint of dysphasia (12 Mar 2018 22:36)  Today is hospital day 7d. Patient seen and examined. NAD. Planned for PEG placement today. Nutrition recommendation noted. Will begin feeds post PEG with Jevity 1.2.     PAST MEDICAL & SURGICAL HISTORY  PAST MEDICAL & SURGICAL HISTORY:  Zenker diverticulum  High cholesterol  HTN (hypertension)  H/O excision of Zenker's diverticulum    ALLERGIES:  No Known Allergies    MEDICATIONS:  STANDING MEDICATIONS  Parenteral Nutrition - Adult 1 Each TPN Continuous <Continuous>    PRN MEDICATIONS    VITALS:   T(F): 96.5  HR: 74  BP: 146/82  RR: 18  SpO2: --    LABS:                        13.1   6.22  )-----------( 311      ( 18 Mar 2018 09:03 )             38.3     03-18    137  |  102  |  12  ----------------------------<  92  4.6   |  20  |  0.8    Ca    8.9      18 Mar 2018 09:03  Phos  3.5     03-18  Mg     2.2     03-18    PT/INR - ( 17 Mar 2018 14:36 )   PT: 12.20 sec;   INR: 1.13 ratio      PTT - ( 17 Mar 2018 14:36 )  PTT:33.1 sec    PHYSICAL EXAM:  GEN: NAD  LUNGS: Clear to auscultation bilaterally   HEART: S1/S2 present. RRR.   ABD: Soft, non-tender, non-distended. Bowel sounds present  EXT: No edema

## 2018-03-19 NOTE — DISCHARGE NOTE ADULT - SECONDARY DIAGNOSIS.
High cholesterol Hypertension, unspecified type Weight loss of more than 10% body weight Aspiration pneumonia, unspecified aspiration pneumonia type, unspecified laterality, unspecified part of lung

## 2018-03-19 NOTE — PRE-ANESTHESIA EVALUATION ADULT - NSANTHOSAYNRD_GEN_A_CORE
No. СЕРГЕЙ screening performed.  STOP BANG Legend: 0-2 = LOW Risk; 3-4 = INTERMEDIATE Risk; 5-8 = HIGH Risk

## 2018-03-19 NOTE — DISCHARGE NOTE ADULT - NSFTFSERV1RD_GEN_ALL_CORE
medication teaching and assessment/rehabilitation services rehabilitation services/teaching and training/medication teaching and assessment observation and assessment/teaching and training/medication teaching and assessment/rehabilitation services

## 2018-03-19 NOTE — PROGRESS NOTE ADULT - SUBJECTIVE AND OBJECTIVE BOX
pt seen in endo p/t PEG placement, seen together with his wife. Reviewed home enteral feedings, methods and monitoring, and progression of tx, and outpt f/u.  d/w home care vendor earlier today, and initial Rx sent. Suspect that pt will be fully dependent on enteral feedings for at least 3 months, and possibly permanently. pt has lost considerable amount of weight, and in particular muscle mass, and he hopes to resume exercise - told to keep to minimal ADL at home for the first 2 weeks. Will f/u as outpt 1-2 weeks post discharge. suspect that formula, volume, concentration, etc will need to be revised once pt is at home.   Once ok with GI to use G-tube, suggest starting with 120ml Jevity 1.2 x first 2 feeds, then 240 ml x 4 meal-pattern feeds per day (7am, noon, 5pm, 10pm). check bmp, phos, Mg, Zn after first 24-36 h, and replete as needed (should be fairly stable as he's been repleted on PPN). Then increase to 360 ml x 3 meals per day, each infused over 40 min, for discharge. Likely will need to increase to 480 ml x 3/d over the next week, as tolerated.

## 2018-03-19 NOTE — DISCHARGE NOTE ADULT - CARE PLAN
Principal Discharge DX:	Dysphagia, unspecified type  Goal:	Resolution of symtpoms  Assessment and plan of treatment:	PEG Tube has been inserted.  Please follow up with ENT, Dr. Shaikh, within 2 weeks of discharge for possible vocal cord injection.  Secondary Diagnosis:	High cholesterol  Secondary Diagnosis:	Hypertension, unspecified type  Secondary Diagnosis:	Weight loss of more than 10% body weight Principal Discharge DX:	Dysphagia, unspecified type  Goal:	Resolution of symptoms  Assessment and plan of treatment:	PEG Tube has been inserted.  Please follow up with ENT, Dr. Shaikh, within 2 weeks of discharge for possible vocal cord injection.  Secondary Diagnosis:	High cholesterol  Goal:	Maintain regular cholesterol levels  Assessment and plan of treatment:	Continue Simvastatin  Secondary Diagnosis:	Hypertension, unspecified type  Goal:	Maintain regular blood pressure  Assessment and plan of treatment:	Continue home medications as prescribed.  Secondary Diagnosis:	Weight loss of more than 10% body weight  Goal:	Gain appropriate amount of weight.  Assessment and plan of treatment:	Gastroenterology has placed PEG tube. Please follow up with Gastroenterologist, Dr. Ritter within two weeks of discharge.  Nutrition has started PEG tube feedings. Please continue feeds only through PEG Tube as instructed by nutrition. Please follow up with Nutrition within one week of discharge at the appointment scheduled for 4/3/18. Please follow up with your primary medical doctor within one week of discharge  Secondary Diagnosis:	Aspiration pneumonia, unspecified aspiration pneumonia type, unspecified laterality, unspecified part of lung  Goal:	Resolution of infection  Assessment and plan of treatment:	Please continue Antibiotics as prescribed.   If symptoms worsen or reoccur, please call 911 or report to the nearest Emergency Medicine Department  Please follow up with Primary Medical Doctor within one week of discharge.  Please a repeat Chest X-Ray completed within 6 weeks of discharge to monitor for resolution of infection. Principal Discharge DX:	Dysphagia, unspecified type  Goal:	Resolution of symptoms  Assessment and plan of treatment:	PEG Tube has been inserted.  Please follow up with ENT, Dr. Shaikh, within 2 weeks of discharge for possible vocal cord injection. Also follow up with speech therapist at [facility]   Secondary Diagnosis:	High cholesterol  Assessment and plan of treatment:	Repeat lipid studies to check if you still need Simvastatin  Secondary Diagnosis:	Hypertension, unspecified type  Goal:	Maintain regular blood pressure  Assessment and plan of treatment:	monitor BP at home and restart Cardesartan if BP high  Secondary Diagnosis:	Weight loss of more than 10% body weight  Goal:	Gain appropriate amount of weight.  Assessment and plan of treatment:	Gastroenterology has placed PEG tube. Please follow up with Gastroenterologist, Dr. Ritter within two weeks of discharge.  Nutrition has started PEG tube feedings. Please continue feeds only through PEG Tube as instructed by nutrition. Please follow up with Nutrition within one week of discharge at the appointment scheduled for 4/3/18. Please follow up with your primary medical doctor within one week of discharge  Secondary Diagnosis:	Aspiration pneumonia, unspecified aspiration pneumonia type, unspecified laterality, unspecified part of lung  Goal:	Resolution of infection  Assessment and plan of treatment:	Please continue Antibiotics as prescribed. Aspiration precautions.  If symptoms worsen or reoccur, please call 911 or report to the nearest Emergency Medicine Department  Please follow up with Primary Medical Doctor within one week of discharge.  Please a repeat Chest X-Ray completed within 6 weeks of discharge to monitor for resolution of infection.

## 2018-03-19 NOTE — DISCHARGE NOTE ADULT - HOSPITAL COURSE
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 He was today at the speech and swallow therapist and his swallow testing was abnormal so he was referred to the hospital for further evaluation. He reports also weight loss of 20 pounds after the surgery. Patient treated for residual Dysphagia s/p Surgical excision of zenker diverticulum ( oropharyngeal vs esophageal) with 10% Weight Loss. Patient is status post CT neck and chest with IV contrast for soft tissue noted. Patient to follow up as outpatient with ENT for possible Vocal fold injection. For Weight Loss patient had PEG tube inserted by GI. Follow up as outpatient within two weeks of discharge. Also, nutrition consulted. Patient to continue diet and feeds as per nutrition. Patient also developed aspirations PNA. Patient treated if IV uansyn and to be discharged with Augmentin as outpatient. 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 He was today at the speech and swallow therapist and his swallow testing was abnormal so he was referred to the hospital for further evaluation. He reports also weight loss of 20 pounds after the surgery. Patient treated for residual Dysphagia s/p Surgical excision of zenker diverticulum ( oropharyngeal vs esophageal) with 10% Weight Loss. Patient is status post CT neck and chest with IV contrast for soft tissue noted. Patient to follow up as outpatient with ENT for possible Vocal fold injection. For Weight Loss patient had PEG tube inserted by GI. Follow up as outpatient within two weeks of discharge. Also, nutrition consulted. Patient to continue diet and feeds as per nutrition. Patient also developed aspirations PNA. Patient treated if IV uansyn and to be discharged with Augmentin as outpatient. 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

## 2018-03-19 NOTE — DISCHARGE NOTE ADULT - CARE PROVIDER_API CALL
Irene Godinez), Nutritional Support  [address] [place] [address]  Phone: [phone]  Fax: [phone]    Milagros Ritter), Internal Medicine  [address] [place] [address]  Phone: [phone]  Fax: [phone]    Jaylen Chowdhury), [address] [place] [address]  Phone: [phone]  Fax: [phone] Irene Godinez), Nutritional Support  [address] [place] [address]  Phone: [phone]  Fax: [phone]    Milagros Ritter), Internal Medicine  [address] [place] [address]  Phone: [phone]  Fax: [phone]    Jaylen Chowdhury), [address] [place] [address]  Phone: [phone]  Fax: [phone]    Ashu Forde), Orthopaedic Surgery  [address] [place] [address]  Phone: [phone]  Fax: [phone]

## 2018-03-20 LAB
ANION GAP SERPL CALC-SCNC: 10 MMOL/L — SIGNIFICANT CHANGE UP (ref 7–14)
BUN SERPL-MCNC: 15 MG/DL — SIGNIFICANT CHANGE UP (ref 10–20)
CALCIUM SERPL-MCNC: 8.6 MG/DL — SIGNIFICANT CHANGE UP (ref 8.5–10.1)
CHLORIDE SERPL-SCNC: 100 MMOL/L — SIGNIFICANT CHANGE UP (ref 98–110)
CO2 SERPL-SCNC: 23 MMOL/L — SIGNIFICANT CHANGE UP (ref 17–32)
CREAT SERPL-MCNC: 0.7 MG/DL — SIGNIFICANT CHANGE UP (ref 0.7–1.5)
GLUCOSE SERPL-MCNC: 110 MG/DL — SIGNIFICANT CHANGE UP (ref 70–110)
HCT VFR BLD CALC: 37.1 % — LOW (ref 42–52)
HGB BLD-MCNC: 12.9 G/DL — LOW (ref 14–18)
MAGNESIUM SERPL-MCNC: 2 MG/DL — SIGNIFICANT CHANGE UP (ref 1.8–2.4)
MCHC RBC-ENTMCNC: 29.6 PG — SIGNIFICANT CHANGE UP (ref 27–31)
MCHC RBC-ENTMCNC: 34.8 G/DL — SIGNIFICANT CHANGE UP (ref 32–37)
MCV RBC AUTO: 85.1 FL — SIGNIFICANT CHANGE UP (ref 80–94)
NRBC # BLD: 0 /100 WBCS — SIGNIFICANT CHANGE UP (ref 0–0)
PHOSPHATE SERPL-MCNC: 3.6 MG/DL — SIGNIFICANT CHANGE UP (ref 2.1–4.9)
PLATELET # BLD AUTO: 296 K/UL — SIGNIFICANT CHANGE UP (ref 130–400)
POTASSIUM SERPL-MCNC: 4.4 MMOL/L — SIGNIFICANT CHANGE UP (ref 3.5–5)
POTASSIUM SERPL-SCNC: 4.4 MMOL/L — SIGNIFICANT CHANGE UP (ref 3.5–5)
RBC # BLD: 4.36 M/UL — LOW (ref 4.7–6.1)
RBC # FLD: 14.5 % — SIGNIFICANT CHANGE UP (ref 11.5–14.5)
SODIUM SERPL-SCNC: 133 MMOL/L — LOW (ref 135–146)
WBC # BLD: 19.41 K/UL — HIGH (ref 4.8–10.8)
WBC # FLD AUTO: 19.41 K/UL — HIGH (ref 4.8–10.8)

## 2018-03-20 RX ORDER — ENOXAPARIN SODIUM 100 MG/ML
40 INJECTION SUBCUTANEOUS DAILY
Qty: 0 | Refills: 0 | Status: DISCONTINUED | OUTPATIENT
Start: 2018-03-20 | End: 2018-03-22

## 2018-03-20 RX ORDER — AMPICILLIN SODIUM AND SULBACTAM SODIUM 250; 125 MG/ML; MG/ML
3 INJECTION, POWDER, FOR SUSPENSION INTRAMUSCULAR; INTRAVENOUS EVERY 6 HOURS
Qty: 0 | Refills: 0 | Status: DISCONTINUED | OUTPATIENT
Start: 2018-03-20 | End: 2018-03-22

## 2018-03-20 RX ORDER — ACETAMINOPHEN 500 MG
650 TABLET ORAL EVERY 6 HOURS
Qty: 0 | Refills: 0 | Status: DISCONTINUED | OUTPATIENT
Start: 2018-03-20 | End: 2018-03-22

## 2018-03-20 RX ORDER — AMPICILLIN SODIUM AND SULBACTAM SODIUM 250; 125 MG/ML; MG/ML
INJECTION, POWDER, FOR SUSPENSION INTRAMUSCULAR; INTRAVENOUS
Qty: 0 | Refills: 0 | Status: DISCONTINUED | OUTPATIENT
Start: 2018-03-20 | End: 2018-03-20

## 2018-03-20 RX ORDER — AMPICILLIN SODIUM AND SULBACTAM SODIUM 250; 125 MG/ML; MG/ML
1.5 INJECTION, POWDER, FOR SUSPENSION INTRAMUSCULAR; INTRAVENOUS ONCE
Qty: 0 | Refills: 0 | Status: COMPLETED | OUTPATIENT
Start: 2018-03-20 | End: 2018-03-20

## 2018-03-20 RX ADMIN — PANTOPRAZOLE SODIUM 40 MILLIGRAM(S): 20 TABLET, DELAYED RELEASE ORAL at 05:30

## 2018-03-20 RX ADMIN — Medication 650 MILLIGRAM(S): at 11:42

## 2018-03-20 RX ADMIN — Medication 650 MILLIGRAM(S): at 00:54

## 2018-03-20 RX ADMIN — AMPICILLIN SODIUM AND SULBACTAM SODIUM 100 GRAM(S): 250; 125 INJECTION, POWDER, FOR SUSPENSION INTRAMUSCULAR; INTRAVENOUS at 18:43

## 2018-03-20 RX ADMIN — ENOXAPARIN SODIUM 40 MILLIGRAM(S): 100 INJECTION SUBCUTANEOUS at 11:11

## 2018-03-20 RX ADMIN — SIMVASTATIN 20 MILLIGRAM(S): 20 TABLET, FILM COATED ORAL at 21:50

## 2018-03-20 RX ADMIN — Medication 650 MILLIGRAM(S): at 20:14

## 2018-03-20 RX ADMIN — AMPICILLIN SODIUM AND SULBACTAM SODIUM 200 GRAM(S): 250; 125 INJECTION, POWDER, FOR SUSPENSION INTRAMUSCULAR; INTRAVENOUS at 23:30

## 2018-03-20 NOTE — CHART NOTE - NSCHARTNOTEFT_GEN_A_CORE
s/p Peg placement yesterday  c/o soreness @ insertion site  Tube ok to use per GI and first feed infusing now, Jevity 1.2 120ml  PPN also infusing    Plan: Will d/c PPN tonight once bag is finished  Give 120ml Jevity 1.2 @ 5pm tonight then 240ml @ 10pm  3/21 give Jevity 1.2 240ml @ 7am, noon, 5pm and 10pm  3/22 increase to Jevity 1.2 360ml @ 7am, noon and 6pm  Petty to provide feeds and supplies at home.   F/U with Dr. Godinez as an outpatient

## 2018-03-20 NOTE — PROGRESS NOTE ADULT - ASSESSMENT
78y year old  Male seen  by GI  for oropharygeal dysphagia after diverticulectomy s/p PEG placement yesterday. patient complains of pain around the site of the PEG tube. 78y year old  Male seen  by GI  for oropharygeal dysphagia after diverticulectomy s/p PEG placement yesterday. patient complains of pain around the site of the PEG tube.  PEG had been used for medications.  will check CBC and clear for feeds if Hg stable.   will f/up 78y year old  Male seen  by GI  for oropharygeal dysphagia after diverticulectomy s/p PEG placement yesterday. patient complains of pain around the site of the PEG tube.  PEG had been used for medications.  no evidence of bleeding or any infection related to PEG tube insertion.  May use PEG for feeding.  will f/up

## 2018-03-20 NOTE — PROGRESS NOTE ADULT - SUBJECTIVE AND OBJECTIVE BOX
SUBJECTIVE:Patient is a 78y old  Male who presents with a chief complaint of dysphasia (19 Mar 2018 14:51)  Today is hospital day 8d. Patient is seen and examined. NAD. The PEG tube was successfully placed. Mild tenderness at insertion site. Abdomen is soft, no guarding noted. No drop in hemoglobin. No signs suggestive of active bleed. Have started Antibiotics.     PAST MEDICAL & SURGICAL HISTORY  PAST MEDICAL & SURGICAL HISTORY:  Zenker diverticulum  High cholesterol  HTN (hypertension)  H/O excision of Zenker's diverticulum    ALLERGIES:  No Known Allergies    MEDICATIONS:  STANDING MEDICATIONS  enoxaparin Injectable 40 milliGRAM(s) SubCutaneous daily  pantoprazole   Suspension 40 milliGRAM(s) Oral before breakfast  Parenteral Nutrition - Adult 1 Each TPN Continuous <Continuous>  simvastatin 20 milliGRAM(s) Oral at bedtime    PRN MEDICATIONS    VITALS:   T(F): 98.5  HR: 89  BP: 89/54  RR: 18  SpO2: 96%    LABS:                        12.9   19.41 )-----------( 296      ( 20 Mar 2018 07:47 )             37.1     03-20    133<L>  |  100  |  15  ----------------------------<  110  4.4   |  23  |  0.7    Ca    8.6      20 Mar 2018 07:47  Phos  3.6     03-20  Mg     2.0     03-20    PHYSICAL EXAM:  GEN: NAD  LUNGS: Clear to auscultation bilaterally   HEART: S1/S2 present. RRR.   ABD: Soft, non-tender, non-distended. Bowel sounds present  EXT: No b/l LE edema

## 2018-03-20 NOTE — PROGRESS NOTE ADULT - ASSESSMENT
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 He was today at the speech and swallow therapist and his swallow testing was abnormal so he was referred to the hospital for further evaluation. He reports also weight loss of 20 pounds after the surgery.     IMPRESSION:  # Residual Dysphagia s/p Surgical excision of zenker diverticulum ( oropharyngeal vs esophageal) with 10% Weight Loss  s/p CT neck and chest with IV contrast for soft tissue noted  GI recommendations noted and appreciated: Planned for PEG tube placement today  ENT consulted: Follow up as outpatient  Speech eval recommend NPO diet   Patient s/p PEG placement  Tylenol prn for pain  Nutritional recommendations noted and appreciated: f/u as outpt 1-2 weeks post discharge  120ml Jevity 1.2 x first 2 feeds  Then, 240 ml x 4 meal-pattern feeds per day (7am, noon, 5pm, 10pm)  Check bmp, phos, Mg, Zn after first 24-36 h,  Then increase to 360 ml x 3 meals per day, each infused over 40 min, for discharge.  Likely will need to increase to 480 ml x 3/d over the next week, as tolerated.    #  HTN (hypertension): Continue Losartan.     # Hypercholesterolemia: continue simvastatin

## 2018-03-20 NOTE — PROGRESS NOTE ADULT - SUBJECTIVE AND OBJECTIVE BOX
GI Followup Note:   Pt seen and examined at bedside.   78y year old  Male seen  by GI  for oropharygeal dysphagia after diverticulectomy s/p PEG placement yesterday. patient complains of pain around the site of the PEG tube.      Subjective:      REVIEW OF SYSTEMS:  Constitutional: No fever, weight loss or fatigue  Cardiovascular: No chest pain, palpitations, dizziness or leg swelling  Gastrointestinal: No abdominal or epigastric pain. No nausea, vomiting or hematemesis; No diarrhea or constipation. No melena or hematochezia.  Skin: No itching, burning, rashes or lesions     Allergies: No Known Allergies      Medications:   pantoprazole   Suspension 40 milliGRAM(s) Oral before breakfast  Parenteral Nutrition - Adult 1 Each TPN Continuous <Continuous>  simvastatin 20 milliGRAM(s) Oral at bedtime      PHYSICAL EXAM:    Vital Signs Last 24 Hrs  T(C): 36.9 (20 Mar 2018 07:29), Max: 36.9 (20 Mar 2018 07:29)  T(F): 98.5 (20 Mar 2018 07:29), Max: 98.5 (20 Mar 2018 07:29)  HR: 89 (20 Mar 2018 07:29) (61 - 113)  BP: 89/54 (20 Mar 2018 07:29) (89/54 - 186/83)  BP(mean): --  RR: 18 (20 Mar 2018 07:29) (18 - 21)  SpO2: 96% (19 Mar 2018 17:12) (96% - 96%)    03-19 @ 07:01 - 03-20 @ 07:00  --------------------------------------------------------  IN: 960 mL / OUT: 600 mL / NET: 360 mL    03-20 @ 07:01 - 03-20 @ 09:29  --------------------------------------------------------  IN: 220 mL / OUT: 0 mL / NET: 220 mL        General: Well developed; well nourished; in no acute distress  HEENT: MMM, conjunctiva and sclera clear  Lungs: Clear, no Rhonchi  Gastrointestinal: No rebound or guarding. The PEG tube site, no hematoma, no pus abdomen is soft non tender except the site.  Skin: Warm and dry. No obvious rash    LABS:                        12.9   19.41 )-----------( 296      ( 20 Mar 2018 07:47 )             37.1     03-20    133<L>  |  100  |  15  ----------------------------<  110  4.4   |  23  |  0.7    Ca    8.6      20 Mar 2018 07:47  Phos  3.6     03-20  Mg     2.0     03-20 GI Followup Note:   Pt seen and examined at bedside.   78y year old  Male seen  by GI  for oropharygeal dysphagia after diverticulectomy s/p PEG placement yesterday. patient complains of pain around the site of the PEG tube.      Subjective:      REVIEW OF SYSTEMS:  Constitutional: No fever, weight loss or fatigue  Cardiovascular: No chest pain, palpitations, dizziness or leg swelling  Gastrointestinal: No abdominal or epigastric pain. No nausea, vomiting or hematemesis; No diarrhea or constipation. No melena or hematochezia.  Skin: No itching, burning, rashes or lesions     Allergies: No Known Allergies      Medications:   pantoprazole   Suspension 40 milliGRAM(s) Oral before breakfast  Parenteral Nutrition - Adult 1 Each TPN Continuous <Continuous>  simvastatin 20 milliGRAM(s) Oral at bedtime      PHYSICAL EXAM:    Vital Signs Last 24 Hrs  T(C): 36.9 (20 Mar 2018 07:29), Max: 36.9 (20 Mar 2018 07:29)  T(F): 98.5 (20 Mar 2018 07:29), Max: 98.5 (20 Mar 2018 07:29)  HR: 89 (20 Mar 2018 07:29) (61 - 113)  BP: 89/54 (20 Mar 2018 07:29) (89/54 - 186/83)  BP(mean): --  RR: 18 (20 Mar 2018 07:29) (18 - 21)  SpO2: 96% (19 Mar 2018 17:12) (96% - 96%)    03-19 @ 07:01 - 03-20 @ 07:00  --------------------------------------------------------  IN: 960 mL / OUT: 600 mL / NET: 360 mL    03-20 @ 07:01 - 03-20 @ 09:29  --------------------------------------------------------  IN: 220 mL / OUT: 0 mL / NET: 220 mL        General: Well developed; well nourished; in no acute distress  HEENT: MMM, conjunctiva and sclera clear  Lungs: Clear, no Rhonchi  Gastrointestinal: No rebound or guarding. The PEG tube site, no hematoma, no pus,  abdomen is soft non tender except around the site.  Skin: Warm and dry. No obvious rash    LABS:                        12.9   19.41 )-----------( 296      ( 20 Mar 2018 07:47 )             37.1     03-20    133<L>  |  100  |  15  ----------------------------<  110  4.4   |  23  |  0.7    Ca    8.6      20 Mar 2018 07:47  Phos  3.6     03-20  Mg     2.0     03-20

## 2018-03-21 LAB
ANION GAP SERPL CALC-SCNC: 10 MMOL/L — SIGNIFICANT CHANGE UP (ref 7–14)
BUN SERPL-MCNC: 13 MG/DL — SIGNIFICANT CHANGE UP (ref 10–20)
CALCIUM SERPL-MCNC: 8.6 MG/DL — SIGNIFICANT CHANGE UP (ref 8.5–10.1)
CHLORIDE SERPL-SCNC: 100 MMOL/L — SIGNIFICANT CHANGE UP (ref 98–110)
CO2 SERPL-SCNC: 23 MMOL/L — SIGNIFICANT CHANGE UP (ref 17–32)
CREAT SERPL-MCNC: 0.8 MG/DL — SIGNIFICANT CHANGE UP (ref 0.7–1.5)
GLUCOSE SERPL-MCNC: 120 MG/DL — HIGH (ref 70–110)
HCT VFR BLD CALC: 35.1 % — LOW (ref 42–52)
HGB BLD-MCNC: 11.9 G/DL — LOW (ref 14–18)
MAGNESIUM SERPL-MCNC: 1.9 MG/DL — SIGNIFICANT CHANGE UP (ref 1.8–2.4)
MCHC RBC-ENTMCNC: 28.9 PG — SIGNIFICANT CHANGE UP (ref 27–31)
MCHC RBC-ENTMCNC: 33.9 G/DL — SIGNIFICANT CHANGE UP (ref 32–37)
MCV RBC AUTO: 85.2 FL — SIGNIFICANT CHANGE UP (ref 80–94)
NRBC # BLD: 0 /100 WBCS — SIGNIFICANT CHANGE UP (ref 0–0)
PHOSPHATE SERPL-MCNC: 3.2 MG/DL — SIGNIFICANT CHANGE UP (ref 2.1–4.9)
PLATELET # BLD AUTO: 241 K/UL — SIGNIFICANT CHANGE UP (ref 130–400)
POTASSIUM SERPL-MCNC: 3.4 MMOL/L — LOW (ref 3.5–5)
POTASSIUM SERPL-SCNC: 3.4 MMOL/L — LOW (ref 3.5–5)
RBC # BLD: 4.12 M/UL — LOW (ref 4.7–6.1)
RBC # FLD: 14.6 % — HIGH (ref 11.5–14.5)
SODIUM SERPL-SCNC: 133 MMOL/L — LOW (ref 135–146)
VIT B12 SERPL-MCNC: >2000 PG/ML — HIGH (ref 232–1245)
WBC # BLD: 11.56 K/UL — HIGH (ref 4.8–10.8)
WBC # FLD AUTO: 11.56 K/UL — HIGH (ref 4.8–10.8)

## 2018-03-21 RX ORDER — POTASSIUM CHLORIDE 20 MEQ
40 PACKET (EA) ORAL ONCE
Qty: 0 | Refills: 0 | Status: COMPLETED | OUTPATIENT
Start: 2018-03-21 | End: 2018-03-21

## 2018-03-21 RX ORDER — CALCIUM ACETATE 667 MG
667 TABLET ORAL
Qty: 0 | Refills: 0 | Status: DISCONTINUED | OUTPATIENT
Start: 2018-03-21 | End: 2018-03-22

## 2018-03-21 RX ADMIN — AMPICILLIN SODIUM AND SULBACTAM SODIUM 200 GRAM(S): 250; 125 INJECTION, POWDER, FOR SUSPENSION INTRAMUSCULAR; INTRAVENOUS at 05:43

## 2018-03-21 RX ADMIN — Medication 40 MILLIEQUIVALENT(S): at 10:23

## 2018-03-21 RX ADMIN — AMPICILLIN SODIUM AND SULBACTAM SODIUM 200 GRAM(S): 250; 125 INJECTION, POWDER, FOR SUSPENSION INTRAMUSCULAR; INTRAVENOUS at 12:05

## 2018-03-21 RX ADMIN — ENOXAPARIN SODIUM 40 MILLIGRAM(S): 100 INJECTION SUBCUTANEOUS at 12:04

## 2018-03-21 RX ADMIN — Medication 650 MILLIGRAM(S): at 05:43

## 2018-03-21 RX ADMIN — Medication 650 MILLIGRAM(S): at 17:41

## 2018-03-21 RX ADMIN — AMPICILLIN SODIUM AND SULBACTAM SODIUM 200 GRAM(S): 250; 125 INJECTION, POWDER, FOR SUSPENSION INTRAMUSCULAR; INTRAVENOUS at 23:39

## 2018-03-21 RX ADMIN — SIMVASTATIN 20 MILLIGRAM(S): 20 TABLET, FILM COATED ORAL at 21:52

## 2018-03-21 RX ADMIN — Medication 667 MILLIGRAM(S): at 21:51

## 2018-03-21 RX ADMIN — PANTOPRAZOLE SODIUM 40 MILLIGRAM(S): 20 TABLET, DELAYED RELEASE ORAL at 05:42

## 2018-03-21 RX ADMIN — AMPICILLIN SODIUM AND SULBACTAM SODIUM 200 GRAM(S): 250; 125 INJECTION, POWDER, FOR SUSPENSION INTRAMUSCULAR; INTRAVENOUS at 17:41

## 2018-03-21 NOTE — PROGRESS NOTE ADULT - SUBJECTIVE AND OBJECTIVE BOX
GI Followup Note:   Pt seen and examined at bedside.   78y year old  Male seen  by GI  post PEG, no events overnight CT scan confirmed PEG position with findings of possible pneumonia now on antibiotics. he complains of mild tenderness at the site.        Subjective:      REVIEW OF SYSTEMS:  Constitutional: No fever, weight loss or fatigue  Cardiovascular: No chest pain, palpitations, dizziness or leg swelling  Gastrointestinal: No abdominal or epigastric pain. No nausea, vomiting or hematemesis; No diarrhea or constipation. No melena or hematochezia.  Skin: No itching, burning, rashes or lesions     Allergies: No Known Allergies      Medications:   acetaminophen   Tablet 650 milliGRAM(s) Oral every 6 hours PRN  ampicillin/sulbactam  IVPB 3 Gram(s) IV Intermittent every 6 hours  enoxaparin Injectable 40 milliGRAM(s) SubCutaneous daily  pantoprazole   Suspension 40 milliGRAM(s) Oral before breakfast  simvastatin 20 milliGRAM(s) Oral at bedtime      PHYSICAL EXAM:    Vital Signs Last 24 Hrs  T(C): 36.2 (21 Mar 2018 07:19), Max: 36.6 (20 Mar 2018 23:26)  T(F): 97.1 (21 Mar 2018 07:19), Max: 97.8 (20 Mar 2018 23:26)  HR: 77 (21 Mar 2018 07:19) (74 - 82)  BP: 113/56 (21 Mar 2018 07:19) (94/55 - 113/56)  BP(mean): --  RR: 19 (21 Mar 2018 07:19) (19 - 19)  SpO2: --    03-20 @ 07:01  -  03-21 @ 07:00  --------------------------------------------------------  IN: 1980 mL / OUT: 375 mL / NET: 1605 mL        General: Well developed; well nourished; in no acute distress  HEENT: MMM, conjunctiva and sclera clear  Lungs: Clear, no Rhonchi  Gastrointestinal: Soft non-tender except at the site of the PEG, no hematoma, no pus it was loosen to 4  Skin: Warm and dry. No obvious rash    LABS:                        11.9   11.56 )-----------( 241      ( 21 Mar 2018 06:41 )             35.1     03-21    133<L>  |  100  |  13  ----------------------------<  120<H>  3.4<L>   |  23  |  0.8    Ca    8.6      21 Mar 2018 06:41  Phos  3.2     03-21  Mg     1.9     03-21

## 2018-03-21 NOTE — PROGRESS NOTE ADULT - ASSESSMENT
78 year old male here with dysphagia and dysphonia post diverticulectomy for ZEncker diverticulum s/p PEG placement two days ago, CT scan showed possible pneumonia and PEG tube is in place. Patient complains of mild tenderness at the site, no hematoma no pus.  plan: may use PEG for feeding         Recall as needed

## 2018-03-21 NOTE — PROGRESS NOTE ADULT - ATTENDING COMMENTS
Above reviewed and agree. patient seen and evaluated. Extensive discussion had with patient and his wife. Plan for inpatient PEG and output followup with ENT for possible vocal fold injection. Patient would like to defer vocal fold injection at this time, would require general anesthetic if done at [facility]
Patient seen and examined independently. I agree with the resident's note, physical exam, and plan except as below.
Patient seen and examined independently. I agree with the resident's note, physical exam, and plan except as below.    Awaiting PEG placement. If GI is unsuccessful, will ask Gen Sx for open PEG.
Patient seen and examined independently. I agree with the resident's note, physical exam, and plan except as below.    Pt is doing well post Peg placement. No CP, SOB. Some abd discomfort around the PEG site. Still with difficulty clearing the secretions. On labs, new leukocytosis and CT a/p with ?multifocal consolidations. CT chest was negative just a few days ago. Concern for aspirations. Started IV Unasyn. Taper PPN to off. Daily labs.
Patient seen and examined independently. I agree with the resident's note, physical exam, and plan except as below.   D/w ENT Dr. Da Silva - she agrees with PEG placement and no contraindications for PEG by GI as per ENT.  D/w GI Dr. Wolf - PEG for Monday  D/w Dr. Moses - cont PPN for now and advises against "blind" insertion of NGT
Patient seen and examined independently. I agree with the resident's note, physical exam, and plan except as below.    Doing well. PEG area ok. WBC improving with Unasyn. Pt denies chills, fevers but does report difficulty clearing secretions. Can try scopolamine patch as the problem will be ongoing. Asp-n precautions. No evidence of electrolyte imbalance since starting feeds. Anticipate d/c with WBC cont to improve and clinically stable.

## 2018-03-21 NOTE — PROGRESS NOTE ADULT - SUBJECTIVE AND OBJECTIVE BOX
SUBJECTIVE:Patient is a 78y old  Male who presents with a chief complaint of dysphasia (19 Mar 2018 14:51)  . Today is hospital day 9d     PAST MEDICAL & SURGICAL HISTORY  PAST MEDICAL & SURGICAL HISTORY:  Zenker diverticulum  High cholesterol  HTN (hypertension)  H/O excision of Zenker's diverticulum    ALLERGIES:  No Known Allergies    MEDICATIONS:  STANDING MEDICATIONS  ampicillin/sulbactam  IVPB 3 Gram(s) IV Intermittent every 6 hours  enoxaparin Injectable 40 milliGRAM(s) SubCutaneous daily  pantoprazole   Suspension 40 milliGRAM(s) Oral before breakfast  potassium chloride   Powder 40 milliEquivalent(s) Oral once  simvastatin 20 milliGRAM(s) Oral at bedtime    PRN MEDICATIONS  acetaminophen   Tablet 650 milliGRAM(s) Oral every 6 hours PRN    VITALS:   T(F): 97.1  HR: 77  BP: 113/56  RR: 19  SpO2: --    LABS:                        11.9   11.56 )-----------( 241      ( 21 Mar 2018 06:41 )             35.1     03-21    133<L>  |  100  |  13  ----------------------------<  120<H>  3.4<L>   |  23  |  0.8    Ca    8.6      21 Mar 2018 06:41  Phos  3.2     03-21  Mg     1.9     03-21    PHYSICAL EXAM:  GEN:   LUNGS: Clear to auscultation bilaterally   HEART: S1/S2 present. RRR.   ABD: Soft, non-tender, non-distended. Bowel sounds present  EXT: SUBJECTIVE:Patient is a 78y old  Male who presents with a chief complaint of dysphasia (19 Mar 2018 14:51)  Today is hospital day 9d. Patient is s/p PEG tube placement and tolerating diet well. Continue to monitor. Plan for DC in am if remains stable. Patient started on Unasyn due to possible Aspiration PNA. WBC trending down. When stable for DC planned to go home with Augmentin.     PAST MEDICAL & SURGICAL HISTORY  PAST MEDICAL & SURGICAL HISTORY:  Zenker diverticulum  High cholesterol  HTN (hypertension)  H/O excision of Zenker's diverticulum    ALLERGIES:  No Known Allergies    MEDICATIONS:  STANDING MEDICATIONS  ampicillin/sulbactam  IVPB 3 Gram(s) IV Intermittent every 6 hours  enoxaparin Injectable 40 milliGRAM(s) SubCutaneous daily  pantoprazole   Suspension 40 milliGRAM(s) Oral before breakfast  potassium chloride   Powder 40 milliEquivalent(s) Oral once  simvastatin 20 milliGRAM(s) Oral at bedtime    PRN MEDICATIONS  acetaminophen   Tablet 650 milliGRAM(s) Oral every 6 hours PRN    VITALS:   T(F): 97.1  HR: 77  BP: 113/56  RR: 19  SpO2: --    LABS:                        11.9   11.56 )-----------( 241      ( 21 Mar 2018 06:41 )             35.1     03-21    133<L>  |  100  |  13  ----------------------------<  120<H>  3.4<L>   |  23  |  0.8    Ca    8.6      21 Mar 2018 06:41  Phos  3.2     03-21  Mg     1.9     03-21    PHYSICAL EXAM:  GEN:   LUNGS: Clear to auscultation bilaterally   HEART: S1/S2 present. RRR.   ABD: Soft, non-tender, non-distended. Bowel sounds present  EXT: SUBJECTIVE:Patient is a 78y old  Male who presents with a chief complaint of dysphasia (19 Mar 2018 14:51)  Today is hospital day 9d. Patient is s/p PEG tube placement and tolerating diet well. Continue to monitor. Plan for DC in am if remains stable. Patient started on Unasyn due to possible Aspiration PNA. WBC trending down. When stable for DC planned to go home with Augmentin.     PAST MEDICAL & SURGICAL HISTORY  PAST MEDICAL & SURGICAL HISTORY:  Zenker diverticulum  High cholesterol  HTN (hypertension)  H/O excision of Zenker's diverticulum    ALLERGIES:  No Known Allergies    MEDICATIONS:  STANDING MEDICATIONS  ampicillin/sulbactam  IVPB 3 Gram(s) IV Intermittent every 6 hours  enoxaparin Injectable 40 milliGRAM(s) SubCutaneous daily  pantoprazole   Suspension 40 milliGRAM(s) Oral before breakfast  potassium chloride   Powder 40 milliEquivalent(s) Oral once  simvastatin 20 milliGRAM(s) Oral at bedtime    PRN MEDICATIONS  acetaminophen   Tablet 650 milliGRAM(s) Oral every 6 hours PRN    VITALS:   T(F): 97.1  HR: 77  BP: 113/56  RR: 19  SpO2: --    LABS:                        11.9   11.56 )-----------( 241      ( 21 Mar 2018 06:41 )             35.1     03-21    133<L>  |  100  |  13  ----------------------------<  120<H>  3.4<L>   |  23  |  0.8    Ca    8.6      21 Mar 2018 06:41  Phos  3.2     03-21  Mg     1.9     03-21    PHYSICAL EXAM:  GEN: NAD  LUNGS: Clear to auscultation bilaterally   HEART: S1/S2 present. RRR.   ABD: Soft, non-tender, non-distended. Bowel sounds present  EXT: Thin LE b/l no edema

## 2018-03-21 NOTE — PROGRESS NOTE ADULT - ASSESSMENT
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 He was today at the speech and swallow therapist and his swallow testing was abnormal so he was referred to the hospital for further evaluation. He reports also weight loss of 20 pounds after the surgery.     IMPRESSION:  # Residual Dysphagia s/p Surgical excision of zenker diverticulum ( oropharyngeal vs esophageal) with 10% Weight Loss  s/p CT neck and chest with IV contrast for soft tissue noted  GI recommendations noted and appreciated: Planned for PEG tube placement today  ENT consulted: Follow up as outpatient  Speech eval recommend NPO diet   Patient s/p PEG placement  Tylenol prn for pain  Nutritional recommendations noted and appreciated: f/u as outpt 1-2 weeks post discharge  Continue diet order as per Nutrition.     #  HTN (hypertension): Continue Losartan.     # Hypercholesterolemia: continue simvastatin    Plan for DC tomorrow or Friday if patient remains stable. 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 He was today at the speech and swallow therapist and his swallow testing was abnormal so he was referred to the hospital for further evaluation. He reports also weight loss of 20 pounds after the surgery.     IMPRESSION:  # Residual Dysphagia s/p Surgical excision of zenker diverticulum ( oropharyngeal vs esophageal) with 10% Weight Loss  s/p CT neck and chest with IV contrast for soft tissue noted  GI placed PEG tube - tolerating feeds well.   ENT consulted: Follow up as outpatient  Speech eval recommend NPO diet   Tylenol prn for pain  Nutritional recommendations noted and appreciated: f/u as outpt 1-2 weeks post discharge  Continue diet order as per Nutrition.     # Aspirations PNA: Continue Unasyn. On discharge send patient home with Augmentin     #  HTN (hypertension): Continue Losartan.     # Hypercholesterolemia: continue simvastatin    Plan for DC tomorrow or Friday if patient remains stable.

## 2018-03-22 VITALS — WEIGHT: 126.99 LBS

## 2018-03-22 LAB
ANION GAP SERPL CALC-SCNC: 10 MMOL/L — SIGNIFICANT CHANGE UP (ref 7–14)
BUN SERPL-MCNC: 11 MG/DL — SIGNIFICANT CHANGE UP (ref 10–20)
CA-I BLD-SCNC: 1.25 MMOL/L — SIGNIFICANT CHANGE UP (ref 1.12–1.3)
CA-I BLD-SCNC: 1.26 MMOL/L — SIGNIFICANT CHANGE UP (ref 1.12–1.3)
CALCIUM SERPL-MCNC: 8.9 MG/DL — SIGNIFICANT CHANGE UP (ref 8.5–10.1)
CHLORIDE SERPL-SCNC: 102 MMOL/L — SIGNIFICANT CHANGE UP (ref 98–110)
CO2 SERPL-SCNC: 24 MMOL/L — SIGNIFICANT CHANGE UP (ref 17–32)
CREAT SERPL-MCNC: 0.7 MG/DL — SIGNIFICANT CHANGE UP (ref 0.7–1.5)
GLUCOSE SERPL-MCNC: 93 MG/DL — SIGNIFICANT CHANGE UP (ref 70–110)
HCT VFR BLD CALC: 35.6 % — LOW (ref 42–52)
HGB BLD-MCNC: 12.3 G/DL — LOW (ref 14–18)
MAGNESIUM SERPL-MCNC: 1.9 MG/DL — SIGNIFICANT CHANGE UP (ref 1.8–2.4)
MCHC RBC-ENTMCNC: 29.3 PG — SIGNIFICANT CHANGE UP (ref 27–31)
MCHC RBC-ENTMCNC: 34.6 G/DL — SIGNIFICANT CHANGE UP (ref 32–37)
MCV RBC AUTO: 84.8 FL — SIGNIFICANT CHANGE UP (ref 80–94)
NRBC # BLD: 0 /100 WBCS — SIGNIFICANT CHANGE UP (ref 0–0)
PHOSPHATE SERPL-MCNC: 3.6 MG/DL — SIGNIFICANT CHANGE UP (ref 2.1–4.9)
PLATELET # BLD AUTO: 277 K/UL — SIGNIFICANT CHANGE UP (ref 130–400)
POTASSIUM SERPL-MCNC: 4.1 MMOL/L — SIGNIFICANT CHANGE UP (ref 3.5–5)
POTASSIUM SERPL-SCNC: 4.1 MMOL/L — SIGNIFICANT CHANGE UP (ref 3.5–5)
RBC # BLD: 4.2 M/UL — LOW (ref 4.7–6.1)
RBC # FLD: 14.9 % — HIGH (ref 11.5–14.5)
SODIUM SERPL-SCNC: 136 MMOL/L — SIGNIFICANT CHANGE UP (ref 135–146)
WBC # BLD: 8.54 K/UL — SIGNIFICANT CHANGE UP (ref 4.8–10.8)
WBC # FLD AUTO: 8.54 K/UL — SIGNIFICANT CHANGE UP (ref 4.8–10.8)

## 2018-03-22 RX ORDER — OMEPRAZOLE 10 MG/1
10 CAPSULE, DELAYED RELEASE ORAL
Qty: 300 | Refills: 0 | OUTPATIENT
Start: 2018-03-22 | End: 2018-04-20

## 2018-03-22 RX ORDER — OMEPRAZOLE 10 MG/1
1 CAPSULE, DELAYED RELEASE ORAL
Qty: 0 | Refills: 0 | COMMUNITY

## 2018-03-22 RX ORDER — CANDESARTAN CILEXETIL 8 MG/1
1 TABLET ORAL
Qty: 0 | Refills: 0 | COMMUNITY

## 2018-03-22 RX ADMIN — ENOXAPARIN SODIUM 40 MILLIGRAM(S): 100 INJECTION SUBCUTANEOUS at 13:19

## 2018-03-22 RX ADMIN — AMPICILLIN SODIUM AND SULBACTAM SODIUM 200 GRAM(S): 250; 125 INJECTION, POWDER, FOR SUSPENSION INTRAMUSCULAR; INTRAVENOUS at 13:18

## 2018-03-22 RX ADMIN — PANTOPRAZOLE SODIUM 40 MILLIGRAM(S): 20 TABLET, DELAYED RELEASE ORAL at 07:06

## 2018-03-22 RX ADMIN — AMPICILLIN SODIUM AND SULBACTAM SODIUM 200 GRAM(S): 250; 125 INJECTION, POWDER, FOR SUSPENSION INTRAMUSCULAR; INTRAVENOUS at 07:05

## 2018-03-22 NOTE — PROGRESS NOTE ADULT - NSHPATTENDINGPLANDISCUSS_GEN_ALL_CORE
pt and medicine team
pt/spouse/staff/ID attending
family at length
pt/spouse/staff
pt/spouse/staff/multiple consultants
pt/staff
staff
pt and primary care team
pt/staff

## 2018-03-22 NOTE — CONSULT NOTE ADULT - ASSESSMENT
LLL MDR GNR PNA in all probability from aspiration.  PNA quite evident on CT chest.  As pts risk of aspiration still remains will recommend a longer duration of therapy.  Liquid Augmentin 875mg via G tube q12h for 14 days.

## 2018-03-22 NOTE — PROGRESS NOTE ADULT - ASSESSMENT
# Dysphagia s/p Surgical excision of zenker diverticulum ( oropharyngeal vs esophageal) with 10% Weight Loss    GI placed PEG tube - tolerating feeds well.   ENT consulted: Follow up as outpatient    Tylenol prn for pain  Nutritional recommendations noted and appreciated: f/u as outpt 1-2 weeks post discharge  Continue diet order as per Nutrition.     # Aspirations PNA: Continue Unasyn. On discharge send patient home with Augmentin x 14d as per ID    #?Particle dz  -outpt ortho f/u    -pt/spouse verbalized understanding of instructions

## 2018-03-22 NOTE — CHART NOTE - NSCHARTNOTEFT_GEN_A_CORE
s/p PEG placement 3/19  Feeds tolerated well  c/o mild reflux after morning feed, pepcid d/c'd when ppn d/c'd  Continues on unasyn for possible pneumonia    Afebrile VSS  Abd soft, ND, +PEG  No edema    Received 240ml Jevity 1.2 X 4 feeds/day & tolerated well    03-22    136  |  102  |  11  ----------------------------<  93  4.1   |  24  |  0.7    Ca    8.9      22 Mar 2018 06:35  Phos  3.6     03-22  Mg     1.9     03-22                          12.3   8.54  )-----------( 277      ( 22 Mar 2018 06:35 )             35.6       PLAN:  1. Increase to Jevity 1.2 360ml @ 7am, noon and 6pm beginning today  2. Restart on pepcid 20mg q12hr via PEG   3. Monitor for BM's  4. [place] to provide feeds and supplies at home  5. F/U with Dr. Godinez as an outpatient on 4/3/18 (pt's wife already made appt for f/u) s/p PEG placement 3/19  Feeds tolerated well  c/o mild reflux after morning feed, pepcid d/c'd when ppn d/c'd  Continues on unasyn for possible pneumonia    Afebrile VSS  Abd soft, ND, +PEG  No edema    Received 240ml Jevity 1.2 X 4 feeds/day & tolerated well    03-22    136  |  102  |  11  ----------------------------<  93  4.1   |  24  |  0.7    Ca    8.9      22 Mar 2018 06:35  Phos  3.6     03-22  Mg     1.9     03-22                          12.3   8.54  )-----------( 277      ( 22 Mar 2018 06:35 )             35.6     Vitamin B12, Serum in AM (03.20.18 @ 07:47)    Vitamin B12, Serum: >2000: Note: Reference Range Change on 12/18/2017. pg/mL      PLAN:  1. Increase to Jevity 1.2 360ml @ 7am, noon and 6pm beginning today  2. Restart on pepcid 20mg q12hr via PEG   3. Monitor for BM's  4. [place] to provide feeds and supplies at home  5. F/U with Dr. Godinez as an outpatient on 4/3/18 (pt's wife already made appt for f/u)

## 2018-03-22 NOTE — PROGRESS NOTE ADULT - SUBJECTIVE AND OBJECTIVE BOX
Patient is a 78y old  Male who presents with a chief complaint of dysphasia (19 Mar 2018 14:51)      INTERVAL HPI/OVERNIGHT EVENTS: no complaints.    HPI:  79 yo M with hx of Zenker's Diverticulum s/p surgical resection in January 2018, HTN, DLD presenting with above chief complaint. He was today at the speech and swallow therapist and his swallow testing was abnormal so he was referred to the hospital for further evaluation. He and his wife report dysphagia to solids and liquids after the surgery since January. The patient reports episodes of aspirations and dysphagia and episodes of cough post meals. He reports also weight loss of 20 pounds after the surgery. He denies any fever, chills, or chest pains or history of aspiration pneumonias. Patient on is a pureed diet at home at feels he has lost energy even though he is still active and goes to the gym.      PAST MEDICAL & SURGICAL HISTORY:    MEDICATIONS  (STANDING):  ampicillin/sulbactam  IVPB 3 Gram(s) IV Intermittent every 6 hours  enoxaparin Injectable 40 milliGRAM(s) SubCutaneous daily  pantoprazole   Suspension 40 milliGRAM(s) Oral before breakfast  simvastatin 20 milliGRAM(s) Oral at bedtime    MEDICATIONS  (PRN):  acetaminophen   Tablet 650 milliGRAM(s) Oral every 6 hours PRN For Temp greater than 38 C (100.4 F)    Home Medications:  omeprazole 20 mg oral delayed release tablet: 1 tab(s) orally once a day (12 Mar 2018 15:30)  simvastatin 20 mg oral tablet: 1 tab(s) orally once a day (at bedtime) (12 Mar 2018 15:30)      Vital Signs Last 24 Hrs  T(C): 35.5 (22 Mar 2018 15:47), Max: 36.8 (21 Mar 2018 23:27)  T(F): 95.9 (22 Mar 2018 15:47), Max: 98.3 (21 Mar 2018 23:27)  HR: 85 (22 Mar 2018 15:47) (79 - 85)  BP: 115/65 (22 Mar 2018 15:47) (115/65 - 123/67)  BP(mean): --  RR: 16 (22 Mar 2018 15:47) (16 - 18)  SpO2: --  CAPILLARY BLOOD GLUCOSE          General: NAD, AAO3, +dysarthria  CV: S1 S2  Pulm: decreased breath sounds at base  Abd: NT/ND/S +BS +PEG  EXT: no clubbing/cyanosis/edema  Neuro: nonfocal    LABS:                        12.3   8.54  )-----------( 277      ( 22 Mar 2018 06:35 )             35.6     03-22    136  |  102  |  11  ----------------------------<  93  4.1   |  24  |  0.7    Ca    8.9      22 Mar 2018 06:35  Phos  3.6     03-22  Mg     1.9     03-22                        Culture - Blood (collected 21 Mar 2018 06:41)  Source: .Blood None  Preliminary Report (22 Mar 2018 10:01):    No growth to date.          Consultant(s) Notes Reviewed:  [x ] YES  [ ] NO    Care Discussed with Consultants/Other Providers/ Housestaff [ x] YES  [ ] NO

## 2018-03-22 NOTE — CONSULT NOTE ADULT - SUBJECTIVE AND OBJECTIVE BOX
VASILIY BRANTLEY  78y, Male  Allergy: No Known Allergies      HPI:  79 yo M with hx of Zenker's Diverticulum s/p surgical resection in January 2018, HTN, DLD presenting with above chief complaint. He was today at the speech and swallow therapist and his swallow testing was abnormal so he was referred to the hospital for further evaluation. He and his wife report dysphagia to solids and liquids after the surgery since January. The patient reports episodes of aspirations and dysphagia and episodes of cough post meals. He reports also weight loss of 20 pounds after the surgery. He denies any fever, chills, or chest pains or history of aspiration pneumonias. Patient on is a pureed diet at home at feels he has lost energy even though he is still active and goes to the gym.  He reports that he followed up with his surgeon twice after the surgery who reassured him that the surgery was successful and the cause for his dysphagia was "all in his head". (12 Mar 2018 15:12)    FAMILY HISTORY:  No pertinent family history    PAST MEDICAL & SURGICAL HISTORY:  Zenker diverticulum  High cholesterol  HTN (hypertension)  H/O excision of Zenker's diverticulum        VITALS:  T(F): 96.2, Max: 98.3 (03-21-18 @ 23:27)  HR: 83  BP: 123/67  RR: 18Vital Signs Last 24 Hrs  T(C): 35.7 (22 Mar 2018 07:16), Max: 36.8 (21 Mar 2018 23:27)  T(F): 96.2 (22 Mar 2018 07:16), Max: 98.3 (21 Mar 2018 23:27)  HR: 83 (22 Mar 2018 07:16) (79 - 83)  BP: 123/67 (22 Mar 2018 07:16) (114/70 - 123/67)  BP(mean): --  RR: 18 (22 Mar 2018 07:16) (18 - 18)  SpO2: --    TESTS & MEASUREMENTS:                        12.3   8.54  )-----------( 277      ( 22 Mar 2018 06:35 )             35.6     03-22    136  |  102  |  11  ----------------------------<  93  4.1   |  24  |  0.7    Ca    8.9      22 Mar 2018 06:35  Phos  3.6     03-22  Mg     1.9     03-22          Culture - Blood (collected 03-21-18 @ 06:41)  Source: .Blood None  Preliminary Report (03-22-18 @ 10:01):    No growth to date.            RADIOLOGY & ADDITIONAL TESTS:    ANTIBIOTICS:  ampicillin/sulbactam  IVPB 3 Gram(s) IV Intermittent every 6 hours

## 2018-03-22 NOTE — PROGRESS NOTE ADULT - PROVIDER SPECIALTY LIST ADULT
Gastroenterology
Hospitalist
Internal Medicine
Nutrition Support
Nutrition Support
Internal Medicine
ENT
Internal Medicine

## 2018-03-23 DIAGNOSIS — J38.01 PARALYSIS OF VOCAL CORDS AND LARYNX, UNILATERAL: ICD-10-CM
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
## 2018-03-23 DIAGNOSIS — E78.5 HYPERLIPIDEMIA, UNSPECIFIED: ICD-10-CM

## 2018-03-23 DIAGNOSIS — J69.0 PNEUMONITIS DUE TO INHALATION OF FOOD AND VOMIT: ICD-10-CM

## 2018-03-23 DIAGNOSIS — R13.10 DYSPHAGIA, UNSPECIFIED: ICD-10-CM

## 2018-03-23 DIAGNOSIS — E78.00 PURE HYPERCHOLESTEROLEMIA, UNSPECIFIED: ICD-10-CM

## 2018-03-23 DIAGNOSIS — R64 CACHEXIA: ICD-10-CM

## 2018-03-23 DIAGNOSIS — R13.19 OTHER DYSPHAGIA: ICD-10-CM

## 2018-03-23 DIAGNOSIS — E41 NUTRITIONAL MARASMUS: ICD-10-CM

## 2018-03-23 DIAGNOSIS — J38.02 PARALYSIS OF VOCAL CORDS AND LARYNX, BILATERAL: ICD-10-CM

## 2018-03-23 DIAGNOSIS — T84.050A PERIPROSTHETIC OSTEOLYSIS OF INTERNAL PROSTHETIC RIGHT HIP JOINT, INITIAL ENCOUNTER: ICD-10-CM

## 2018-03-23 DIAGNOSIS — I10 ESSENTIAL (PRIMARY) HYPERTENSION: ICD-10-CM

## 2018-03-23 DIAGNOSIS — T73.0XXA STARVATION, INITIAL ENCOUNTER: ICD-10-CM
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
## 2018-03-23 DIAGNOSIS — R13.13 DYSPHAGIA, PHARYNGEAL PHASE: ICD-10-CM

## 2018-03-23 PROBLEM — Z00.00 ENCOUNTER FOR PREVENTIVE HEALTH EXAMINATION: Status: ACTIVE | Noted: 2018-03-23

## 2018-03-23 RX ORDER — PANTOPRAZOLE SODIUM 20 MG/1
1 TABLET, DELAYED RELEASE ORAL
Qty: 15 | Refills: 0 | OUTPATIENT
Start: 2018-03-23 | End: 2018-04-06

## 2018-03-26 LAB
CULTURE RESULTS: SIGNIFICANT CHANGE UP
SPECIMEN SOURCE: SIGNIFICANT CHANGE UP

## 2018-03-30 PROBLEM — E78.00 PURE HYPERCHOLESTEROLEMIA, UNSPECIFIED: Chronic | Status: ACTIVE | Noted: 2018-03-12

## 2018-03-30 PROBLEM — I10 ESSENTIAL (PRIMARY) HYPERTENSION: Chronic | Status: ACTIVE | Noted: 2018-03-12

## 2018-04-05 ENCOUNTER — TRANSCRIPTION ENCOUNTER (OUTPATIENT)
Age: 78
End: 2018-04-05

## 2018-04-09 ENCOUNTER — APPOINTMENT (OUTPATIENT)
Dept: OTOLARYNGOLOGY | Facility: CLINIC | Age: 78
End: 2018-04-09
Payer: MEDICARE

## 2018-04-09 DIAGNOSIS — Z78.9 OTHER SPECIFIED HEALTH STATUS: ICD-10-CM

## 2018-04-09 DIAGNOSIS — C44.90 UNSPECIFIED MALIGNANT NEOPLASM OF SKIN, UNSPECIFIED: ICD-10-CM

## 2018-04-09 DIAGNOSIS — E78.00 PURE HYPERCHOLESTEROLEMIA, UNSPECIFIED: ICD-10-CM

## 2018-04-09 PROCEDURE — 99214 OFFICE O/P EST MOD 30 MIN: CPT | Mod: 25

## 2018-04-09 PROCEDURE — 31575 DIAGNOSTIC LARYNGOSCOPY: CPT

## 2018-04-09 RX ORDER — SIMVASTATIN 20 MG/1
20 TABLET, FILM COATED ORAL
Refills: 0 | Status: ACTIVE | COMMUNITY

## 2018-05-01 ENCOUNTER — OUTPATIENT (OUTPATIENT)
Dept: OUTPATIENT SERVICES | Facility: HOSPITAL | Age: 78
LOS: 1 days | Discharge: HOME | End: 2018-05-01

## 2018-05-01 DIAGNOSIS — R13.12 DYSPHAGIA, OROPHARYNGEAL PHASE: ICD-10-CM

## 2018-05-01 DIAGNOSIS — Z98.890 OTHER SPECIFIED POSTPROCEDURAL STATES: Chronic | ICD-10-CM

## 2018-05-01 DIAGNOSIS — R13.10 DYSPHAGIA, UNSPECIFIED: ICD-10-CM

## 2018-05-07 ENCOUNTER — APPOINTMENT (OUTPATIENT)
Dept: OTOLARYNGOLOGY | Facility: CLINIC | Age: 78
End: 2018-05-07

## 2018-07-16 ENCOUNTER — APPOINTMENT (OUTPATIENT)
Dept: OTOLARYNGOLOGY | Facility: CLINIC | Age: 78
End: 2018-07-16
Payer: MEDICARE

## 2018-07-16 PROCEDURE — 99214 OFFICE O/P EST MOD 30 MIN: CPT | Mod: 25

## 2018-07-16 PROCEDURE — 31575 DIAGNOSTIC LARYNGOSCOPY: CPT

## 2018-07-19 PROBLEM — K22.5 DIVERTICULUM OF ESOPHAGUS, ACQUIRED: Chronic | Status: ACTIVE | Noted: 2018-03-12

## 2018-07-19 NOTE — DISCHARGE NOTE ADULT - NS MD DC FALL RISK RISK
For information on Fall & Injury Prevention, visit [email or web address]

## 2018-08-02 ENCOUNTER — FORM ENCOUNTER (OUTPATIENT)
Age: 78
End: 2018-08-02

## 2018-08-03 ENCOUNTER — OUTPATIENT (OUTPATIENT)
Dept: OUTPATIENT SERVICES | Facility: HOSPITAL | Age: 78
LOS: 1 days | Discharge: HOME | End: 2018-08-03

## 2018-08-03 VITALS
WEIGHT: 132.94 LBS | OXYGEN SATURATION: 98 % | RESPIRATION RATE: 20 BRPM | TEMPERATURE: 98 F | HEIGHT: 68 IN | DIASTOLIC BLOOD PRESSURE: 72 MMHG | SYSTOLIC BLOOD PRESSURE: 127 MMHG | HEART RATE: 78 BPM

## 2018-08-03 DIAGNOSIS — J38.01 PARALYSIS OF VOCAL CORDS AND LARYNX, UNILATERAL: ICD-10-CM

## 2018-08-03 DIAGNOSIS — Z01.818 ENCOUNTER FOR OTHER PREPROCEDURAL EXAMINATION: ICD-10-CM

## 2018-08-03 DIAGNOSIS — R13.19 OTHER DYSPHAGIA: ICD-10-CM

## 2018-08-03 DIAGNOSIS — E41 NUTRITIONAL MARASMUS: ICD-10-CM

## 2018-08-03 DIAGNOSIS — D64.9 ANEMIA, UNSPECIFIED: ICD-10-CM

## 2018-08-03 DIAGNOSIS — I10 ESSENTIAL (PRIMARY) HYPERTENSION: ICD-10-CM

## 2018-08-03 DIAGNOSIS — Z98.890 OTHER SPECIFIED POSTPROCEDURAL STATES: Chronic | ICD-10-CM

## 2018-08-03 DIAGNOSIS — K21.9 GASTRO-ESOPHAGEAL REFLUX DISEASE WITHOUT ESOPHAGITIS: ICD-10-CM

## 2018-08-03 LAB
ALBUMIN SERPL ELPH-MCNC: 4.3 G/DL — SIGNIFICANT CHANGE UP (ref 3.5–5.2)
ALP SERPL-CCNC: 104 U/L — SIGNIFICANT CHANGE UP (ref 30–115)
ALT FLD-CCNC: 26 U/L — SIGNIFICANT CHANGE UP (ref 0–41)
ANION GAP SERPL CALC-SCNC: 14 MMOL/L — SIGNIFICANT CHANGE UP (ref 7–14)
AST SERPL-CCNC: 31 U/L — SIGNIFICANT CHANGE UP (ref 0–41)
BASOPHILS # BLD AUTO: 0.02 K/UL — SIGNIFICANT CHANGE UP (ref 0–0.2)
BASOPHILS NFR BLD AUTO: 0.3 % — SIGNIFICANT CHANGE UP (ref 0–1)
BILIRUB SERPL-MCNC: 0.7 MG/DL — SIGNIFICANT CHANGE UP (ref 0.2–1.2)
BUN SERPL-MCNC: 27 MG/DL — HIGH (ref 10–20)
CALCIUM SERPL-MCNC: 10.1 MG/DL — SIGNIFICANT CHANGE UP (ref 8.5–10.1)
CHLORIDE SERPL-SCNC: 96 MMOL/L — LOW (ref 98–110)
CO2 SERPL-SCNC: 29 MMOL/L — SIGNIFICANT CHANGE UP (ref 17–32)
CREAT SERPL-MCNC: 0.9 MG/DL — SIGNIFICANT CHANGE UP (ref 0.7–1.5)
EOSINOPHIL # BLD AUTO: 0.07 K/UL — SIGNIFICANT CHANGE UP (ref 0–0.7)
EOSINOPHIL NFR BLD AUTO: 1.1 % — SIGNIFICANT CHANGE UP (ref 0–8)
GLUCOSE SERPL-MCNC: 92 MG/DL — SIGNIFICANT CHANGE UP (ref 70–99)
HCT VFR BLD CALC: 44 % — SIGNIFICANT CHANGE UP (ref 42–52)
HGB BLD-MCNC: 14.8 G/DL — SIGNIFICANT CHANGE UP (ref 14–18)
IMM GRANULOCYTES NFR BLD AUTO: 0.3 % — SIGNIFICANT CHANGE UP (ref 0.1–0.3)
LYMPHOCYTES # BLD AUTO: 1.2 K/UL — SIGNIFICANT CHANGE UP (ref 1.2–3.4)
LYMPHOCYTES # BLD AUTO: 18.1 % — LOW (ref 20.5–51.1)
MCHC RBC-ENTMCNC: 29.1 PG — SIGNIFICANT CHANGE UP (ref 27–31)
MCHC RBC-ENTMCNC: 33.6 G/DL — SIGNIFICANT CHANGE UP (ref 32–37)
MCV RBC AUTO: 86.4 FL — SIGNIFICANT CHANGE UP (ref 80–94)
MONOCYTES # BLD AUTO: 0.54 K/UL — SIGNIFICANT CHANGE UP (ref 0.1–0.6)
MONOCYTES NFR BLD AUTO: 8.1 % — SIGNIFICANT CHANGE UP (ref 1.7–9.3)
NEUTROPHILS # BLD AUTO: 4.79 K/UL — SIGNIFICANT CHANGE UP (ref 1.4–6.5)
NEUTROPHILS NFR BLD AUTO: 72.1 % — SIGNIFICANT CHANGE UP (ref 42.2–75.2)
NRBC # BLD: 0 /100 WBCS — SIGNIFICANT CHANGE UP (ref 0–0)
PLATELET # BLD AUTO: 301 K/UL — SIGNIFICANT CHANGE UP (ref 130–400)
POTASSIUM SERPL-MCNC: 4.6 MMOL/L — SIGNIFICANT CHANGE UP (ref 3.5–5)
POTASSIUM SERPL-SCNC: 4.6 MMOL/L — SIGNIFICANT CHANGE UP (ref 3.5–5)
PROT SERPL-MCNC: 7.4 G/DL — SIGNIFICANT CHANGE UP (ref 6–8)
RBC # BLD: 5.09 M/UL — SIGNIFICANT CHANGE UP (ref 4.7–6.1)
RBC # FLD: 14.4 % — SIGNIFICANT CHANGE UP (ref 11.5–14.5)
SODIUM SERPL-SCNC: 139 MMOL/L — SIGNIFICANT CHANGE UP (ref 135–146)
WBC # BLD: 6.64 K/UL — SIGNIFICANT CHANGE UP (ref 4.8–10.8)
WBC # FLD AUTO: 6.64 K/UL — SIGNIFICANT CHANGE UP (ref 4.8–10.8)

## 2018-08-03 RX ORDER — SIMVASTATIN 20 MG/1
1 TABLET, FILM COATED ORAL
Qty: 0 | Refills: 0 | COMMUNITY

## 2018-08-03 NOTE — H&P PST ADULT - PMH
High cholesterol    HTN (hypertension)    Zenker diverticulum Arthritis  OA  GERD (gastroesophageal reflux disease)    High cholesterol    HTN (hypertension)    Zenker diverticulum

## 2018-08-03 NOTE — H&P PST ADULT - REASON FOR ADMISSION
pt with h/o zenkers diverticulum  1-10-18 with complications of dysphagia and hoarseness of speech due to damage to vocal cord  which eventually required peg insertion 3-2018 which pt still has  pt denies current cp,sob,palp,cough,dysuria   can walk 2 fos and several blocks without sob  pt states this is complete med/surg history  ex beni limited by balance issue

## 2018-08-06 DIAGNOSIS — R13.19 OTHER DYSPHAGIA: ICD-10-CM

## 2018-08-06 DIAGNOSIS — Z02.9 ENCOUNTER FOR ADMINISTRATIVE EXAMINATIONS, UNSPECIFIED: ICD-10-CM

## 2018-08-06 DIAGNOSIS — D64.9 ANEMIA, UNSPECIFIED: ICD-10-CM

## 2018-08-06 DIAGNOSIS — E41 NUTRITIONAL MARASMUS: ICD-10-CM

## 2018-08-17 ENCOUNTER — APPOINTMENT (OUTPATIENT)
Dept: OTOLARYNGOLOGY | Facility: HOSPITAL | Age: 78
End: 2018-08-17
Payer: MEDICARE

## 2018-08-17 ENCOUNTER — OUTPATIENT (OUTPATIENT)
Dept: OUTPATIENT SERVICES | Facility: HOSPITAL | Age: 78
LOS: 1 days | Discharge: HOME | End: 2018-08-17

## 2018-08-17 VITALS — DIASTOLIC BLOOD PRESSURE: 77 MMHG | HEART RATE: 90 BPM | RESPIRATION RATE: 20 BRPM | SYSTOLIC BLOOD PRESSURE: 135 MMHG

## 2018-08-17 VITALS
HEIGHT: 68 IN | WEIGHT: 134.92 LBS | HEART RATE: 90 BPM | RESPIRATION RATE: 18 BRPM | TEMPERATURE: 98 F | DIASTOLIC BLOOD PRESSURE: 74 MMHG | SYSTOLIC BLOOD PRESSURE: 141 MMHG

## 2018-08-17 DIAGNOSIS — Z98.890 OTHER SPECIFIED POSTPROCEDURAL STATES: Chronic | ICD-10-CM

## 2018-08-17 PROCEDURE — 31571 LARYNGOSCOP W/VC INJ + SCOPE: CPT

## 2018-08-17 RX ORDER — ONDANSETRON 8 MG/1
4 TABLET, FILM COATED ORAL ONCE
Qty: 0 | Refills: 0 | Status: DISCONTINUED | OUTPATIENT
Start: 2018-08-17 | End: 2018-08-17

## 2018-08-17 RX ORDER — MORPHINE SULFATE 50 MG/1
1 CAPSULE, EXTENDED RELEASE ORAL
Qty: 0 | Refills: 0 | Status: DISCONTINUED | OUTPATIENT
Start: 2018-08-17 | End: 2018-08-17

## 2018-08-17 RX ORDER — MORPHINE SULFATE 50 MG/1
2 CAPSULE, EXTENDED RELEASE ORAL
Qty: 0 | Refills: 0 | Status: DISCONTINUED | OUTPATIENT
Start: 2018-08-17 | End: 2018-08-17

## 2018-08-17 RX ORDER — SODIUM CHLORIDE 9 MG/ML
1000 INJECTION, SOLUTION INTRAVENOUS
Qty: 0 | Refills: 0 | Status: DISCONTINUED | OUTPATIENT
Start: 2018-08-17 | End: 2018-08-17

## 2018-08-17 RX ADMIN — SODIUM CHLORIDE 100 MILLILITER(S): 9 INJECTION, SOLUTION INTRAVENOUS at 12:10

## 2018-08-17 NOTE — CHART NOTE - NSCHARTNOTEFT_GEN_A_CORE
PACU ANESTHESIA ADMISSION NOTE      Procedure: Direct laryngoscopy with injection of left vocal cord: use of operating microscope, 0.2cc injection of Prolaryn gel, part # [id number], lot number [id number]    Post op diagnosis:  Vocal cord paralysis, unilateral complete      ____  Intubated  TV:______       Rate: ______      FiO2: ______    _x___  Patent Airway    _x___  Full return of protective reflexes    _x___  Full recovery from anesthesia / back to baseline status    Vitals:  T(C): 36.7   BP: 153/76  SpO2: -96-  Heart rate 103  and RR 18  Mental Status:  _x___ Awake   _____ Alert   _____ Drowsy   _____ Sedated    Nausea/Vomiting:  _x___  NO       ______Yes,   See Post - Op Orders         Pain Scale (0-10):  __0___    Treatment: _x___ None    ____ See Post - Op/PCA Orders    Post - Operative Fluids:   __x__ Oral   ____ See Post - Op Orders    Plan: Discharge:   _x_ __Home       _____Floor     _____Critical Care    _____  Other:_________________    Comments:  No anesthesia issues or complications noted.  Discharge when criteria met.

## 2018-08-17 NOTE — BRIEF OPERATIVE NOTE - PROCEDURE
<<-----Click on this checkbox to enter Procedure Direct laryngoscopy with injection of left vocal cord  08/17/2018  use of operating microscope, 0.2cc injection of Prolaryn gel, part # [id number], lot number [id number]  Active  [email or web address]

## 2018-08-20 PROBLEM — M19.90 UNSPECIFIED OSTEOARTHRITIS, UNSPECIFIED SITE: Chronic | Status: ACTIVE | Noted: 2018-08-03

## 2018-08-20 PROBLEM — K21.9 GASTRO-ESOPHAGEAL REFLUX DISEASE WITHOUT ESOPHAGITIS: Chronic | Status: ACTIVE | Noted: 2018-08-03

## 2018-08-22 ENCOUNTER — APPOINTMENT (OUTPATIENT)
Dept: OTOLARYNGOLOGY | Facility: CLINIC | Age: 78
End: 2018-08-22
Payer: MEDICARE

## 2018-08-22 VITALS — BODY MASS INDEX: 28.51 KG/M2 | WEIGHT: 167 LBS | HEIGHT: 64 IN

## 2018-08-22 PROCEDURE — 99213 OFFICE O/P EST LOW 20 MIN: CPT | Mod: 25

## 2018-08-22 PROCEDURE — 31575 DIAGNOSTIC LARYNGOSCOPY: CPT

## 2018-08-23 DIAGNOSIS — J38.01 PARALYSIS OF VOCAL CORDS AND LARYNX, UNILATERAL: ICD-10-CM

## 2018-08-23 DIAGNOSIS — E78.00 PURE HYPERCHOLESTEROLEMIA, UNSPECIFIED: ICD-10-CM

## 2018-08-23 DIAGNOSIS — I10 ESSENTIAL (PRIMARY) HYPERTENSION: ICD-10-CM

## 2018-08-23 DIAGNOSIS — K22.5 DIVERTICULUM OF ESOPHAGUS, ACQUIRED: ICD-10-CM

## 2018-09-12 ENCOUNTER — APPOINTMENT (OUTPATIENT)
Dept: OTOLARYNGOLOGY | Facility: CLINIC | Age: 78
End: 2018-09-12
Payer: MEDICARE

## 2018-09-12 VITALS — BODY MASS INDEX: 28.51 KG/M2 | HEIGHT: 64 IN | WEIGHT: 167 LBS

## 2018-09-12 DIAGNOSIS — R63.4 ABNORMAL WEIGHT LOSS: ICD-10-CM

## 2018-09-12 DIAGNOSIS — R25.3 FASCICULATION: ICD-10-CM

## 2018-09-12 PROCEDURE — 99214 OFFICE O/P EST MOD 30 MIN: CPT

## 2018-09-17 ENCOUNTER — OUTPATIENT (OUTPATIENT)
Dept: OUTPATIENT SERVICES | Facility: HOSPITAL | Age: 78
LOS: 1 days | Discharge: HOME | End: 2018-09-17

## 2018-09-17 ENCOUNTER — APPOINTMENT (OUTPATIENT)
Dept: NEUROSURGERY | Facility: CLINIC | Age: 78
End: 2018-09-17

## 2018-09-17 DIAGNOSIS — R41.3 OTHER AMNESIA: ICD-10-CM

## 2018-09-17 DIAGNOSIS — Z98.890 OTHER SPECIFIED POSTPROCEDURAL STATES: Chronic | ICD-10-CM

## 2018-09-17 DIAGNOSIS — R27.9 UNSPECIFIED LACK OF COORDINATION: ICD-10-CM

## 2018-09-24 ENCOUNTER — OUTPATIENT (OUTPATIENT)
Dept: OUTPATIENT SERVICES | Facility: HOSPITAL | Age: 78
LOS: 1 days | Discharge: HOME | End: 2018-09-24

## 2018-09-24 VITALS
DIASTOLIC BLOOD PRESSURE: 70 MMHG | TEMPERATURE: 98 F | WEIGHT: 130.07 LBS | RESPIRATION RATE: 20 BRPM | HEART RATE: 78 BPM | SYSTOLIC BLOOD PRESSURE: 145 MMHG | OXYGEN SATURATION: 98 % | HEIGHT: 68 IN

## 2018-09-24 DIAGNOSIS — Z98.890 OTHER SPECIFIED POSTPROCEDURAL STATES: Chronic | ICD-10-CM

## 2018-09-24 DIAGNOSIS — J38.1 POLYP OF VOCAL CORD AND LARYNX: ICD-10-CM

## 2018-09-24 DIAGNOSIS — Z01.818 ENCOUNTER FOR OTHER PREPROCEDURAL EXAMINATION: ICD-10-CM

## 2018-09-24 LAB
ALBUMIN SERPL ELPH-MCNC: 4.4 G/DL — SIGNIFICANT CHANGE UP (ref 3.5–5.2)
ALP SERPL-CCNC: 106 U/L — SIGNIFICANT CHANGE UP (ref 30–115)
ALT FLD-CCNC: 30 U/L — SIGNIFICANT CHANGE UP (ref 0–41)
ANION GAP SERPL CALC-SCNC: 14 MMOL/L — SIGNIFICANT CHANGE UP (ref 7–14)
APTT BLD: 39 SEC — SIGNIFICANT CHANGE UP (ref 27–39.2)
AST SERPL-CCNC: 32 U/L — SIGNIFICANT CHANGE UP (ref 0–41)
BASOPHILS # BLD AUTO: 0.03 K/UL — SIGNIFICANT CHANGE UP (ref 0–0.2)
BASOPHILS NFR BLD AUTO: 0.5 % — SIGNIFICANT CHANGE UP (ref 0–1)
BILIRUB SERPL-MCNC: 0.3 MG/DL — SIGNIFICANT CHANGE UP (ref 0.2–1.2)
BUN SERPL-MCNC: 23 MG/DL — HIGH (ref 10–20)
CALCIUM SERPL-MCNC: 9.4 MG/DL — SIGNIFICANT CHANGE UP (ref 8.5–10.1)
CHLORIDE SERPL-SCNC: 97 MMOL/L — LOW (ref 98–110)
CO2 SERPL-SCNC: 30 MMOL/L — SIGNIFICANT CHANGE UP (ref 17–32)
CREAT SERPL-MCNC: 0.7 MG/DL — SIGNIFICANT CHANGE UP (ref 0.7–1.5)
EOSINOPHIL # BLD AUTO: 0.05 K/UL — SIGNIFICANT CHANGE UP (ref 0–0.7)
EOSINOPHIL NFR BLD AUTO: 0.9 % — SIGNIFICANT CHANGE UP (ref 0–8)
GLUCOSE SERPL-MCNC: 64 MG/DL — LOW (ref 70–99)
HCT VFR BLD CALC: 40.7 % — LOW (ref 42–52)
HGB BLD-MCNC: 13.3 G/DL — LOW (ref 14–18)
IMM GRANULOCYTES NFR BLD AUTO: 0.3 % — SIGNIFICANT CHANGE UP (ref 0.1–0.3)
INR BLD: 1.1 RATIO — SIGNIFICANT CHANGE UP (ref 0.65–1.3)
LYMPHOCYTES # BLD AUTO: 1.24 K/UL — SIGNIFICANT CHANGE UP (ref 1.2–3.4)
LYMPHOCYTES # BLD AUTO: 21.1 % — SIGNIFICANT CHANGE UP (ref 20.5–51.1)
MCHC RBC-ENTMCNC: 28.9 PG — SIGNIFICANT CHANGE UP (ref 27–31)
MCHC RBC-ENTMCNC: 32.7 G/DL — SIGNIFICANT CHANGE UP (ref 32–37)
MCV RBC AUTO: 88.5 FL — SIGNIFICANT CHANGE UP (ref 80–94)
MONOCYTES # BLD AUTO: 0.59 K/UL — SIGNIFICANT CHANGE UP (ref 0.1–0.6)
MONOCYTES NFR BLD AUTO: 10.1 % — HIGH (ref 1.7–9.3)
NEUTROPHILS # BLD AUTO: 3.94 K/UL — SIGNIFICANT CHANGE UP (ref 1.4–6.5)
NEUTROPHILS NFR BLD AUTO: 67.1 % — SIGNIFICANT CHANGE UP (ref 42.2–75.2)
NRBC # BLD: 0 /100 WBCS — SIGNIFICANT CHANGE UP (ref 0–0)
PLATELET # BLD AUTO: 384 K/UL — SIGNIFICANT CHANGE UP (ref 130–400)
POTASSIUM SERPL-MCNC: 5 MMOL/L — SIGNIFICANT CHANGE UP (ref 3.5–5)
POTASSIUM SERPL-SCNC: 5 MMOL/L — SIGNIFICANT CHANGE UP (ref 3.5–5)
PROT SERPL-MCNC: 7.2 G/DL — SIGNIFICANT CHANGE UP (ref 6–8)
PROTHROM AB SERPL-ACNC: 11.9 SEC — SIGNIFICANT CHANGE UP (ref 9.95–12.87)
RBC # BLD: 4.6 M/UL — LOW (ref 4.7–6.1)
RBC # FLD: 15.8 % — HIGH (ref 11.5–14.5)
SODIUM SERPL-SCNC: 141 MMOL/L — SIGNIFICANT CHANGE UP (ref 135–146)
WBC # BLD: 5.87 K/UL — SIGNIFICANT CHANGE UP (ref 4.8–10.8)
WBC # FLD AUTO: 5.87 K/UL — SIGNIFICANT CHANGE UP (ref 4.8–10.8)

## 2018-09-24 RX ORDER — ASPIRIN/SOD BICARB/CITRIC ACID 324 MG
1 TABLET, EFFERVESCENT ORAL
Qty: 0 | Refills: 0 | COMMUNITY

## 2018-09-24 NOTE — H&P PST ADULT - HISTORY OF PRESENT ILLNESS
77 y/o male scheduled for microdirect laryngoscopy with injection of vocal fold  the pt had the same procedure done in august but it was not successful now scheduled sgsin  reports no c/o cp,sob,palpitations,cough or dysuria  1-2 fos without sob

## 2018-09-24 NOTE — H&P PST ADULT - PMH
Arthritis  OA  Dysphagia    GERD (gastroesophageal reflux disease)    High cholesterol    HTN (hypertension)    Zenker diverticulum

## 2018-10-02 ENCOUNTER — APPOINTMENT (OUTPATIENT)
Dept: OTOLARYNGOLOGY | Facility: AMBULATORY SURGERY CENTER | Age: 78
End: 2018-10-02
Payer: MEDICARE

## 2018-10-02 ENCOUNTER — OUTPATIENT (OUTPATIENT)
Dept: OUTPATIENT SERVICES | Facility: HOSPITAL | Age: 78
LOS: 1 days | Discharge: HOME | End: 2018-10-02

## 2018-10-02 VITALS
DIASTOLIC BLOOD PRESSURE: 66 MMHG | HEART RATE: 99 BPM | RESPIRATION RATE: 18 BRPM | OXYGEN SATURATION: 96 % | SYSTOLIC BLOOD PRESSURE: 111 MMHG

## 2018-10-02 VITALS
DIASTOLIC BLOOD PRESSURE: 68 MMHG | TEMPERATURE: 96 F | OXYGEN SATURATION: 97 % | HEART RATE: 80 BPM | HEIGHT: 68 IN | SYSTOLIC BLOOD PRESSURE: 157 MMHG | WEIGHT: 130.07 LBS | RESPIRATION RATE: 21 BRPM

## 2018-10-02 DIAGNOSIS — Z98.890 OTHER SPECIFIED POSTPROCEDURAL STATES: Chronic | ICD-10-CM

## 2018-10-02 PROCEDURE — 31571 LARYNGOSCOP W/VC INJ + SCOPE: CPT

## 2018-10-02 RX ORDER — CANDESARTAN CILEXETIL 8 MG/1
1 TABLET ORAL
Qty: 0 | Refills: 0 | COMMUNITY

## 2018-10-02 RX ORDER — ONDANSETRON 8 MG/1
4 TABLET, FILM COATED ORAL ONCE
Qty: 0 | Refills: 0 | Status: DISCONTINUED | OUTPATIENT
Start: 2018-10-02 | End: 2018-10-17

## 2018-10-02 RX ORDER — SIMVASTATIN 20 MG/1
1 TABLET, FILM COATED ORAL
Qty: 0 | Refills: 0 | COMMUNITY

## 2018-10-02 RX ORDER — SODIUM CHLORIDE 9 MG/ML
1000 INJECTION, SOLUTION INTRAVENOUS
Qty: 0 | Refills: 0 | Status: DISCONTINUED | OUTPATIENT
Start: 2018-10-02 | End: 2018-10-17

## 2018-10-02 RX ORDER — CHLORTHALIDONE 50 MG
1 TABLET ORAL
Qty: 0 | Refills: 0 | COMMUNITY

## 2018-10-02 RX ORDER — OXYCODONE AND ACETAMINOPHEN 5; 325 MG/1; MG/1
1 TABLET ORAL EVERY 4 HOURS
Qty: 0 | Refills: 0 | Status: DISCONTINUED | OUTPATIENT
Start: 2018-10-02 | End: 2018-10-02

## 2018-10-02 RX ORDER — MEPERIDINE HYDROCHLORIDE 50 MG/ML
12.5 INJECTION INTRAMUSCULAR; INTRAVENOUS; SUBCUTANEOUS ONCE
Qty: 0 | Refills: 0 | Status: DISCONTINUED | OUTPATIENT
Start: 2018-10-02 | End: 2018-10-02

## 2018-10-02 RX ADMIN — SODIUM CHLORIDE 100 MILLILITER(S): 9 INJECTION, SOLUTION INTRAVENOUS at 12:25

## 2018-10-02 NOTE — BRIEF OPERATIVE NOTE - PROCEDURE
<<-----Click on this checkbox to enter Procedure Direct laryngoscopy with injection of left vocal cord  10/02/2018    Active  [id number]

## 2018-10-02 NOTE — PRE-ANESTHESIA EVALUATION ADULT - NSANTHPMHFT_GEN_ALL_CORE
pt have ALS, early state as per neurologist, but able to walk in floor and go upstairs with stairs, no SOB, dysphonia from vocal cord damage, S/P peg for dysphagia, had vocal cord injection in Aug 2018, kept intubated and pt got self extubated in RR

## 2018-10-05 DIAGNOSIS — K21.9 GASTRO-ESOPHAGEAL REFLUX DISEASE WITHOUT ESOPHAGITIS: ICD-10-CM

## 2018-10-05 DIAGNOSIS — J38.01 PARALYSIS OF VOCAL CORDS AND LARYNX, UNILATERAL: ICD-10-CM

## 2018-10-05 DIAGNOSIS — I10 ESSENTIAL (PRIMARY) HYPERTENSION: ICD-10-CM

## 2018-10-05 DIAGNOSIS — R13.10 DYSPHAGIA, UNSPECIFIED: ICD-10-CM

## 2018-10-05 DIAGNOSIS — M19.90 UNSPECIFIED OSTEOARTHRITIS, UNSPECIFIED SITE: ICD-10-CM

## 2018-10-05 DIAGNOSIS — E78.5 HYPERLIPIDEMIA, UNSPECIFIED: ICD-10-CM

## 2018-10-10 ENCOUNTER — APPOINTMENT (OUTPATIENT)
Dept: OTOLARYNGOLOGY | Facility: CLINIC | Age: 78
End: 2018-10-10
Payer: MEDICARE

## 2018-10-10 VITALS
HEIGHT: 64 IN | SYSTOLIC BLOOD PRESSURE: 130 MMHG | DIASTOLIC BLOOD PRESSURE: 89 MMHG | WEIGHT: 167 LBS | BODY MASS INDEX: 28.51 KG/M2

## 2018-10-10 DIAGNOSIS — Z85.828 PERSONAL HISTORY OF OTHER MALIGNANT NEOPLASM OF SKIN: ICD-10-CM

## 2018-10-10 DIAGNOSIS — R13.10 DYSPHAGIA, UNSPECIFIED: ICD-10-CM

## 2018-10-10 PROCEDURE — 99213 OFFICE O/P EST LOW 20 MIN: CPT | Mod: 25

## 2018-10-10 PROCEDURE — 31575 DIAGNOSTIC LARYNGOSCOPY: CPT

## 2018-11-07 ENCOUNTER — APPOINTMENT (OUTPATIENT)
Dept: OTOLARYNGOLOGY | Facility: CLINIC | Age: 78
End: 2018-11-07
Payer: MEDICARE

## 2018-11-07 ENCOUNTER — FORM ENCOUNTER (OUTPATIENT)
Age: 78
End: 2018-11-07

## 2018-11-07 VITALS
WEIGHT: 168 LBS | HEIGHT: 64 IN | DIASTOLIC BLOOD PRESSURE: 85 MMHG | SYSTOLIC BLOOD PRESSURE: 127 MMHG | BODY MASS INDEX: 28.68 KG/M2

## 2018-11-07 DIAGNOSIS — J38.01 PARALYSIS OF VOCAL CORDS AND LARYNX, UNILATERAL: ICD-10-CM

## 2018-11-07 DIAGNOSIS — G31.9 DEGENERATIVE DISEASE OF NERVOUS SYSTEM, UNSPECIFIED: ICD-10-CM

## 2018-11-07 PROCEDURE — 99213 OFFICE O/P EST LOW 20 MIN: CPT

## 2018-11-08 ENCOUNTER — OUTPATIENT (OUTPATIENT)
Dept: OUTPATIENT SERVICES | Facility: HOSPITAL | Age: 78
LOS: 1 days | Discharge: HOME | End: 2018-11-08

## 2018-11-08 DIAGNOSIS — R13.10 DYSPHAGIA, UNSPECIFIED: ICD-10-CM

## 2018-11-08 DIAGNOSIS — Z98.890 OTHER SPECIFIED POSTPROCEDURAL STATES: Chronic | ICD-10-CM

## 2018-11-18 NOTE — ED ADULT NURSE NOTE - CAS TRG GENERAL AIRWAY, MLM
Notes  Patient was given instructions. No bath or shower while wearing the monitor. If microwave is in use walk away. No pacemaker. Patient understands diary instructions. Patient will return after 48 hours for removal. Holter# [id number] .  Procedure  48 hour holter applied.        Electronically signed by:Rola Ceballos CMA  Jul 19 2018  9:39AM CST    
Patent

## 2018-11-28 ENCOUNTER — OUTPATIENT (OUTPATIENT)
Dept: OUTPATIENT SERVICES | Facility: HOSPITAL | Age: 78
LOS: 1 days | Discharge: HOME | End: 2018-11-28

## 2018-11-28 DIAGNOSIS — R13.10 DYSPHAGIA, UNSPECIFIED: ICD-10-CM

## 2018-11-28 DIAGNOSIS — J38.01 PARALYSIS OF VOCAL CORDS AND LARYNX, UNILATERAL: ICD-10-CM

## 2018-11-28 DIAGNOSIS — Z98.890 OTHER SPECIFIED POSTPROCEDURAL STATES: Chronic | ICD-10-CM

## 2019-01-17 ENCOUNTER — APPOINTMENT (OUTPATIENT)
Dept: OTOLARYNGOLOGY | Facility: CLINIC | Age: 79
End: 2019-01-17

## 2019-09-03 NOTE — H&P ADULT - HISTORY OF PRESENT ILLNESS
79 yo M with hx of Zenker's Diverticulum s/p surgical resection in January 2018, HTN, DLD presenting with above chief complaint. He was today at the speech and swallow therapist and his swallow testing was abnormal so he was referred to the hospital for further evaluation. He and his wife report dysphagia to solids and liquids after the surgery since January. The patient reports episodes of aspirations and dysphagia and episodes of cough post meals. He reports also weight loss of 20 pounds after the surgery. He denies any fever, chills, or chest pains or history of aspiration pneumonias. Patient on is a pureed diet at home at feels he has lost energy even though he is still active and goes to the gym.  He reports that he followed up with his surgeon twice after the surgery who reassured him that the surgery was successful and the cause for his dysphagia was "all in his head".
No

## 2020-12-22 NOTE — ASU DISCHARGE PLAN (ADULT/PEDIATRIC). - BATHING
no change [Appropriately responsive] : appropriately responsive [Alert] : alert [No Acute Distress] : no acute distress [No Lymphadenopathy] : no lymphadenopathy [Regular Rate Rhythm] : regular rate rhythm [No Murmurs] : no murmurs [Clear to Auscultation B/L] : clear to auscultation bilaterally [Soft] : soft [Non-tender] : non-tender [Non-distended] : non-distended [No HSM] : No HSM [No Lesions] : no lesions [No Mass] : no mass [Oriented x3] : oriented x3 [Examination Of The Breasts] : a normal appearance [No Masses] : no breast masses were palpable [Normal] : normal

## 2021-05-07 NOTE — H&P PST ADULT - PRO ANTICIPATED DISCH DISP
Consent: The patient's consent was obtained including but not limited to risks of crusting, scabbing, blistering, scarring, darker or lighter pigmentary change, recurrence, incomplete removal and infection. Detail Level: Detailed Render Note In Bullet Format When Appropriate: No Post-Care Instructions: I reviewed with the patient in detail post-care instructions. Patient is to wear sunprotection, and avoid picking at any of the treated lesions. Pt may apply Vaseline to crusted or scabbing areas. home Medical Necessity Information: It is in your best interest to select a reason for this procedure from the list below. All of these items fulfill various CMS LCD requirements except the new and changing color options. Number Of Freeze-Thaw Cycles: 3 freeze-thaw cycles Include Z78.9 (Other Specified Conditions Influencing Health Status) As An Associated Diagnosis?: Yes Duration Of Freeze Thaw-Cycle (Seconds): 3 Detail Level: Zone Medical Necessity Clause: This procedure was medically necessary because the lesions that were treated were:

## 2022-06-02 NOTE — CHART NOTE - NSCHARTNOTEFT_GEN_A_CORE
s/p PEG placement 3/19  Feeds started yesterday and tolerated well  c/o soreness at PEG site but improved after GI in earlier and "loosened it up"  CT abd/pelvis done yesterday and shows PEG in place, no signs of leak but ? pneumonia    Afebrile VSS  Abd soft, ND, +PEG  No edema    Received 240ml Jevity 1.2 last night and again this morning, tolerated well  No BM's yet    Daily Weight in k.2 (20 Mar 2018 14:13)- weight stable since admission        133<L>  |  100  |  13  ----------------------------<  120<H>  3.4<L>   |  23  |  0.8    Ca    8.6      21 Mar 2018 06:41  Phos  3.2       Mg     1.9                               11.9   11.56 )-----------( 241      ( 21 Mar 2018 06:41 )             35.1     < from: CT Abdomen and Pelvis w/ IV Cont (18 @ 15:44) >    PERITONEUM/MESENTERY/BOWEL:No bowel obstruction, ascites or   pneumoperitoneum. Percutaneous gastrostomy tube with balloon in the   stomach. Descending and sigmoid diverticulosis without evidence for acute   diverticulitis. Unremarkable appendix.     IMPRESSION:   1.  Bibasilar consolidations, left greater than right. Findings are   consistent with multifocal pneumonia; follow-up to resolution is   recommended.    2.  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 Outpatient   orthopedic follow up is suggested.    3.  Cholelithiasis.    4.  No acute intra-abdominal pathology.    PLAN:  1. Give Jevity 1.2 240ml @ 7am, noon, 5pm and 10pm today via PEG  2. 3/22 increase to Jevity 1.2 360ml @ 7am, noon and 6pm  3. Potassium supplemented earlier, f/u bmp, ip, mg in am 3/23   4. [place] to provide feeds and supplies at home  5. F/U with Dr. Godinez as an outpatient on 4/3/18 (pt's wife already made appt for f/u) s/p PEG placement 3/19  Feeds started yesterday and tolerated well  c/o soreness at PEG site but improved after GI in earlier and "loosened it up"  CT abd/pelvis done yesterday and shows PEG in place, no signs of leak but ? pneumonia    Afebrile VSS  Abd soft, ND, +PEG  No edema    Received 240ml Jevity 1.2 last night and again this morning, tolerated well  No BM's yet    Daily Weight in k.2 (20 Mar 2018 14:13)- weight stable since admission        133<L>  |  100  |  13  ----------------------------<  120<H>  3.4<L>   |  23  |  0.8    Ca    8.6      21 Mar 2018 06:41  Phos  3.2       Mg     1.9                               11.9   11.56 )-----------( 241      ( 21 Mar 2018 06:41 )             35.1     < from: CT Abdomen and Pelvis w/ IV Cont (18 @ 15:44) >    PERITONEUM/MESENTERY/BOWEL:No bowel obstruction, ascites or   pneumoperitoneum. Percutaneous gastrostomy tube with balloon in the   stomach. Descending and sigmoid diverticulosis without evidence for acute   diverticulitis. Unremarkable appendix.     IMPRESSION:   1.  Bibasilar consolidations, left greater than right. Findings are   consistent with multifocal pneumonia; follow-up to resolution is   recommended.    2.  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 Outpatient   orthopedic follow up is suggested.    3.  Cholelithiasis.    4.  No acute intra-abdominal pathology.    Started on unasyn for possible pneumonia    PLAN:  1. Give Jevity 1.2 240ml @ 7am, noon, 5pm and 10pm today via PEG  2. 3/22 increase to Jevity 1.2 360ml @ 7am, noon and 6pm  3. Potassium supplemented earlier, f/u bmp, ip, mg in am 3/23   4. [place] to provide feeds and supplies at home  5. F/U with Dr. Godinez as an outpatient on 4/3/18 (pt's wife already made appt for f/u) s/p PEG placement 3/19  Feeds started yesterday and tolerated well  c/o soreness at PEG site but improved after GI in earlier and "loosened it up"  CT abd/pelvis done yesterday and shows PEG in place, no signs of leak but ? pneumonia  Started on unasyn for possible pneumonia    Afebrile VSS  Abd soft, ND, +PEG  No edema    Received 240ml Jevity 1.2 last night and again this morning, tolerated well  No BM's yet    Daily Weight in k.2 (20 Mar 2018 14:13)- weight stable since admission        133<L>  |  100  |  13  ----------------------------<  120<H>  3.4<L>   |  23  |  0.8    Ca    8.6      21 Mar 2018 06:41  Phos  3.2       Mg     1.9                               11.9   11.56 )-----------( 241      ( 21 Mar 2018 06:41 )             35.1     < from: CT Abdomen and Pelvis w/ IV Cont (18 @ 15:44) >    PERITONEUM/MESENTERY/BOWEL:No bowel obstruction, ascites or   pneumoperitoneum. Percutaneous gastrostomy tube with balloon in the   stomach. Descending and sigmoid diverticulosis without evidence for acute   diverticulitis. Unremarkable appendix.     IMPRESSION:   1.  Bibasilar consolidations, left greater than right. Findings are   consistent with multifocal pneumonia; follow-up to resolution is   recommended.    2.  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 Outpatient   orthopedic follow up is suggested.    3.  Cholelithiasis.    4.  No acute intra-abdominal pathology.    PLAN:  1. Give Jevity 1.2 240ml @ 7am, noon, 5pm and 10pm today via PEG  2. 3/22 increase to Jevity 1.2 360ml @ 7am, noon and 6pm  3. Potassium supplemented earlier, f/u bmp, ip, mg in am 3/23   4. [place] to provide feeds and supplies at home  5. F/U with Dr. Godinez as an outpatient on 4/3/18 (pt's wife already made appt for f/u) The patient is a 32y Female complaining of weakness.

## 2022-12-01 NOTE — ED ADULT NURSE NOTE - AS [facility]
PAIN HISTORY and PERTINENT REVIEW OF SYMPTOMS:  · When did you first notice your pain? April 2019  · What circumstances may have been the cause of your pain? Just began, with no apparent reason  · Pain described as Shooting  · Severity   · Today is 2/10  · Least is 3/10  · Worst is 10/10  · Average is 5/10  · Pain radiates to Back and left leg  · Associated numbness or tingling - none  · Weakness no neither right-hand dominant  · Bowel/Bladder incontinence:  No  · What makes your pain better?    · What makes your pain worst? Standing, Sitting, Activity/moving around and Walking  · Falls: no  · Assistive devices: no  · Pain has affected:   · Sleep yes  · Daily activities yes  · Work yes and no  · Sitting yes  · Standing yes  · Walking yes    PREVIOUS TREATMENT FOR CURRENT SYMPTOMS:  · Chiropractic - no none  · Physical Therapy - no none  · Diagnostic testing - yes MRI  · Imaging done elsewhere no  · Condition or device that would prohibit an MRI: no  · Medications -  no   · Type of medication: none none  · Pain contract with any provider: no    RELEVANT MEDICAL CONCERNS  · Blood thinners other than aspirin (i.e. Coumadin or Plavix) - No none  · Aware of any relevant allergy - None  · Other doctors treating pain symptoms - no   · Pregnant - no   · Diabetes -- None   · Involved in a legal process regarding current symptoms - Not involved in legal process regarding symptoms  · Goals in seeking treatment - Relief from pain symptoms, Return to leisure activities and Improved sleep     (4) no limitation

## 2023-12-21 NOTE — PROGRESS NOTE ADULT - SUBJECTIVE AND OBJECTIVE BOX
VASILIY BRANTLEY  78y, Male  Allergy: No Known Allergies    Hospital Day: 5d    PMH/PSH:  PAST MEDICAL & SURGICAL HISTORY:  Zenker diverticulum  High cholesterol  HTN (hypertension)  H/O excision of Zenker's diverticulum    LAST 24-Hr EVENTS:  No complaints  Worried about the PEG    VITALS:  T(F): 97.8 (03-17-18 @ 07:36), Max: 97.8 (03-17-18 @ 07:36)  HR: 61 (03-17-18 @ 07:36)  BP: 119/70 (03-17-18 @ 07:36) (111/62 - 124/69)  RR: 18 (03-17-18 @ 07:36)    TESTS & MEASUREMENTS:      03-15-18 @ 07:01  -  03-16-18 @ 07:00  --------------------------------------------------------  IN: 1445 mL / OUT: 200 mL / NET: 1245 mL    03-16-18 @ 07:01  -  03-17-18 @ 07:00  --------------------------------------------------------  IN: 0 mL / OUT: 600 mL / NET: -600 mL                            13.8   7.20  )-----------( 366      ( 16 Mar 2018 07:12 )             40.1       03-16    137  |  97<L>  |  12  ----------------------------<  81  4.2   |  25  |  0.8    Ca    8.8      16 Mar 2018 07:12  Phos  3.0     03-16  Mg     2.0     03-16        RECENT DIAGNOSTIC ORDERS:  Prothrombin Time and INR, Plasma:  Start Date:16-Mar-2018. AM Sched. Collection:17-Mar-2018 04:30 (03-16-18 @ 16:15)  Activated Partial Thromboplastin Time:  Start Date:16-Mar-2018. AM Sched. Collection:17-Mar-2018 04:30 (03-16-18 @ 16:15)  Type + Screen: AM  Sched. Collection:17-Mar-2018 04:30 (03-16-18 @ 16:15)  Complete Blood Count: Repeat From: 16-Mar-2018 16:22 To: 19-Mar-2018 04:30, Every 1 day(s) (03-16-18 @ 16:23)  Complete Blood Count: AM Sched. Collection: 17-Mar-2018 04:30 (03-16-18 @ 16:23)  Basic Metabolic Panel: Repeat From: 16-Mar-2018 16:22 To: 19-Mar-2018 04:30, Every 1 day(s) (03-16-18 @ 16:23)  Basic Metabolic Panel: AM Sched. Collection: 17-Mar-2018 04:30 (03-16-18 @ 16:23)  Magnesium, Serum: Repeat From: 16-Mar-2018 16:22 To: 19-Mar-2018 04:30, Every 1 day(s) (03-16-18 @ 16:23)  Magnesium, Serum: AM Sched. Collection: 17-Mar-2018 04:30 (03-16-18 @ 16:23)  Phosphorus Level, Serum: Repeat From: 16-Mar-2018 16:23 To: 19-Mar-2018 04:30, Every 1 day(s) (03-16-18 @ 16:23)  Phosphorus Level, Serum: AM Sched. Collection: 17-Mar-2018 04:30 (03-16-18 @ 16:23)      MEDICATIONS:  MEDICATIONS  (STANDING):  enoxaparin Injectable 40 milliGRAM(s) SubCutaneous every 24 hours  fat emulsion (Plant Based) 20% Infusion 1.93 Gm/kG/Day (34.74 mL/Hr) IV Continuous <Continuous>  Parenteral Nutrition - Adult 1 Each (75 mL/Hr) TPN Continuous <Continuous>  Parenteral Nutrition - Adult 1 Each (75 mL/Hr) TPN Continuous <Continuous>    PHYSICAL EXAM:    GENERAL: A&O x3,  in NAD/P, cachectic   NECK: No Swelling, No JVD.   CHEST/LUNG: Good air entry, No crackles, No rhonchi, No wheezing.  HEART: RRR, No murmurs.  ABDOMEN: Soft, Nontender, Nondistended; Bowel sounds present.  EXTREMITIES:  Peripheral Pulses Present , No clubbing, No edema Detail Level: Generalized Quality 226: Preventive Care And Screening: Tobacco Use: Screening And Cessation Intervention: Patient screened for tobacco use and is an ex/non-smoker
